# Patient Record
Sex: FEMALE | Race: WHITE | NOT HISPANIC OR LATINO | URBAN - METROPOLITAN AREA
[De-identification: names, ages, dates, MRNs, and addresses within clinical notes are randomized per-mention and may not be internally consistent; named-entity substitution may affect disease eponyms.]

---

## 2017-01-06 ENCOUNTER — OUTPATIENT (OUTPATIENT)
Dept: OUTPATIENT SERVICES | Facility: HOSPITAL | Age: 22
LOS: 1 days | End: 2017-01-06
Payer: COMMERCIAL

## 2017-01-06 DIAGNOSIS — Z98.89 OTHER SPECIFIED POSTPROCEDURAL STATES: Chronic | ICD-10-CM

## 2017-01-06 PROCEDURE — 74020: CPT | Mod: 26

## 2017-01-06 PROCEDURE — 74020: CPT

## 2018-09-16 VITALS
HEART RATE: 82 BPM | TEMPERATURE: 99 F | WEIGHT: 160.06 LBS | SYSTOLIC BLOOD PRESSURE: 140 MMHG | OXYGEN SATURATION: 99 % | DIASTOLIC BLOOD PRESSURE: 84 MMHG | RESPIRATION RATE: 18 BRPM

## 2018-09-16 PROCEDURE — 74177 CT ABD & PELVIS W/CONTRAST: CPT | Mod: 26

## 2018-09-16 RX ORDER — DIPHENHYDRAMINE HCL 50 MG
25 CAPSULE ORAL ONCE
Qty: 0 | Refills: 0 | Status: COMPLETED | OUTPATIENT
Start: 2018-09-16 | End: 2018-09-16

## 2018-09-16 RX ORDER — DIPHENHYDRAMINE HCL 50 MG
25 CAPSULE ORAL EVERY 4 HOURS
Qty: 0 | Refills: 0 | Status: DISCONTINUED | OUTPATIENT
Start: 2018-09-16 | End: 2018-09-17

## 2018-09-16 RX ORDER — IOHEXOL 300 MG/ML
30 INJECTION, SOLUTION INTRAVENOUS ONCE
Qty: 0 | Refills: 0 | Status: COMPLETED | OUTPATIENT
Start: 2018-09-16 | End: 2018-09-16

## 2018-09-16 RX ORDER — ONDANSETRON 8 MG/1
4 TABLET, FILM COATED ORAL ONCE
Qty: 0 | Refills: 0 | Status: COMPLETED | OUTPATIENT
Start: 2018-09-16 | End: 2018-09-16

## 2018-09-16 RX ORDER — HYDROMORPHONE HYDROCHLORIDE 2 MG/ML
1 INJECTION INTRAMUSCULAR; INTRAVENOUS; SUBCUTANEOUS ONCE
Qty: 0 | Refills: 0 | Status: DISCONTINUED | OUTPATIENT
Start: 2018-09-16 | End: 2018-09-16

## 2018-09-16 RX ORDER — CEFOTETAN DISODIUM 1 G
2 VIAL (EA) INJECTION ONCE
Qty: 0 | Refills: 0 | Status: COMPLETED | OUTPATIENT
Start: 2018-09-16 | End: 2018-09-16

## 2018-09-16 RX ORDER — SODIUM CHLORIDE 9 MG/ML
1000 INJECTION INTRAMUSCULAR; INTRAVENOUS; SUBCUTANEOUS ONCE
Qty: 0 | Refills: 0 | Status: COMPLETED | OUTPATIENT
Start: 2018-09-16 | End: 2018-09-16

## 2018-09-16 RX ADMIN — Medication 25 MILLIGRAM(S): at 21:05

## 2018-09-16 RX ADMIN — HYDROMORPHONE HYDROCHLORIDE 1 MILLIGRAM(S): 2 INJECTION INTRAMUSCULAR; INTRAVENOUS; SUBCUTANEOUS at 17:32

## 2018-09-16 RX ADMIN — SODIUM CHLORIDE 1000 MILLILITER(S): 9 INJECTION INTRAMUSCULAR; INTRAVENOUS; SUBCUTANEOUS at 17:31

## 2018-09-16 RX ADMIN — HYDROMORPHONE HYDROCHLORIDE 1 MILLIGRAM(S): 2 INJECTION INTRAMUSCULAR; INTRAVENOUS; SUBCUTANEOUS at 21:54

## 2018-09-16 RX ADMIN — Medication 100 GRAM(S): at 21:05

## 2018-09-16 RX ADMIN — HYDROMORPHONE HYDROCHLORIDE 1 MILLIGRAM(S): 2 INJECTION INTRAMUSCULAR; INTRAVENOUS; SUBCUTANEOUS at 23:45

## 2018-09-16 RX ADMIN — IOHEXOL 30 MILLILITER(S): 300 INJECTION, SOLUTION INTRAVENOUS at 17:47

## 2018-09-16 RX ADMIN — HYDROMORPHONE HYDROCHLORIDE 1 MILLIGRAM(S): 2 INJECTION INTRAMUSCULAR; INTRAVENOUS; SUBCUTANEOUS at 20:06

## 2018-09-16 RX ADMIN — Medication 25 MILLIGRAM(S): at 17:31

## 2018-09-16 RX ADMIN — ONDANSETRON 4 MILLIGRAM(S): 8 TABLET, FILM COATED ORAL at 17:31

## 2018-09-16 RX ADMIN — HYDROMORPHONE HYDROCHLORIDE 1 MILLIGRAM(S): 2 INJECTION INTRAMUSCULAR; INTRAVENOUS; SUBCUTANEOUS at 19:13

## 2018-09-16 NOTE — ED PROVIDER NOTE - CARE PLAN
Principal Discharge DX:	Abdominal pain, unspecified abdominal location  Secondary Diagnosis:	Endometriosis

## 2018-09-16 NOTE — ED PROVIDER NOTE - MEDICAL DECISION MAKING DETAILS
Impression: acute on chronic abd pain, being followed by pain management, on oxy and fentanyl patch. Afebrile, hds. Labs reviewed and wnl including wbc, lfts, lipase. UA neg for infection, + ketones. CT a/p concerning for possible appendicitis. Pt given dose of cefotetan and surgery consult obtained- do not suspect appendicitis- findings may be reactive 2/2 endometriosis. Surgery requesting ruq sono to r/o cholecystitis. Pt requiring multiple doses of dilaudid. Case d/w Dr. Wilson. Impression: acute on chronic abd pain, being followed by pain management, on oxy and fentanyl patch. Afebrile, hds. Labs reviewed and wnl including wbc, lfts, lipase. UA neg for infection, + ketones. CT a/p concerning for possible appendicitis. Pt given dose of cefotetan and surgery consult obtained- do not suspect appendicitis- findings may be reactive 2/2 endometriosis. Surgery requesting ruq sono to r/o cholecystitis. Pt requiring multiple doses of dilaudid. Case d/w Dr. Wilson. Gyn consult also requested for evaluation. Impression: acute on chronic abd pain, being followed by pain management, on oxy and fentanyl patch. Afebrile, hds. Labs reviewed and wnl including wbc, lfts, lipase. UA neg for infection, + ketones. CT a/p concerning for possible appendicitis. Pt given dose of cefotetan and surgery consult obtained- do not suspect appendicitis- findings may be reactive 2/2 endometriosis. Surgery requesting ruq sono to r/o cholecystitis. Pt requiring multiple doses of dilaudid. Case d/w Dr. Wilson. Gyn consult also requested for evaluation.  Patient with intractable pain requiring multiple dosages of pain meds in ED . Seen by two services both surgery and gyn. Both services sign off on case. Patient admitted now to medicine for pain control and further eval and re evaluation. Patient is pt of Dr. Wilson.

## 2018-09-16 NOTE — ED PROVIDER NOTE - OBJECTIVE STATEMENT
Pt is a 23yo f, h/o endometriosis s/p laparoscopy '15 and '16, being followed by pain management, on oxy and fentanyl patch, who p/w abd and chest pain x 3 days. CP described as lower chest/ epig region, occurs when abd pain is severe, a/w n/v/d. No sob, palp. Abd pain to rlq, constant, 10/10, no allev/ aggrav factors. No vag bleeding/ dc. No urinary sx's, flank pain. Sent by Dr. Wilson for evaluation. Pt is a 23yo f, h/o endometriosis s/p laparoscopy '15 and '16, being followed by pain management, on oxy and fentanyl patch, who p/w abd and chest pain x 3 days. CP described as tightness to lower chest/ epig region, occurs when abd pain is severe, a/w n/v/d. + belching and burning sensation to chest. + mild sob, palp/ heart racing. Abd pain to rlq, constant, 10/10, no allev/ aggrav factors. No vag bleeding/ dc. No urinary sx's, flank pain. Sent by Dr. Wilson for evaluation.

## 2018-09-16 NOTE — ED ADULT TRIAGE NOTE - CHIEF COMPLAINT QUOTE
c/o diffused abdominal pain, vomiting, diarrhea, dizziness x 3 days.  Patient has not been able to tolerate PO intake.  Hx endometriosis.  Denies recent travels, fevers.  Sent in by Dr. Wilson

## 2018-09-16 NOTE — ED ADULT NURSE NOTE - OBJECTIVE STATEMENT
c.o diffuse abd pain for 3 days with nausea, vomiting, dizziness, chest pain and difficulty in urinating. c.o diarrhea for "many years". denies any fever/chills. states she took a oxycodone this morning and had a fentanyl patch on left upper chest wall (12mcg) for 2 days. denies any sob. ekg at bedside

## 2018-09-16 NOTE — ED PROVIDER NOTE - PHYSICAL EXAMINATION
VITAL SIGNS: I have reviewed nursing notes and confirm.  CONSTITUTIONAL: Well-developed; well-nourished; in no acute distress.   SKIN:  warm and dry, no acute rash.   HEAD:  normocephalic, atraumatic.  EYES: PERRL, EOM intact; conjunctiva and sclera clear.  ENT: No nasal discharge; airway clear.   NECK: Supple; non tender.  CARD: S1, S2 normal; no murmurs, gallops, or rubs. Regular rate and rhythm.   RESP:  Clear to auscultation b/l, no wheezes, rales or rhonchi.  ABD: Normal bowel sounds; soft; non-distended; + generalized tenderness; no guarding/ rebound. No cvat.   EXT: Normal ROM. No clubbing, cyanosis or edema. 2+ pulses to b/l ue/le.  NEURO: Alert, oriented, grossly unremarkable  PSYCH: Cooperative, mood and affect appropriate. VITAL SIGNS: I have reviewed nursing notes and confirm.  CONSTITUTIONAL: Well-developed; well-nourished; in no acute distress.   SKIN:  warm and dry, no acute rash.   HEAD:  normocephalic, atraumatic.  EYES: PERRL, EOM intact; conjunctiva and sclera clear.  ENT: No nasal discharge; airway clear.   NECK: Supple; non tender.  CARD: S1, S2 normal; no murmurs, gallops, or rubs. Regular rate and rhythm.   RESP:  Clear to auscultation b/l, no wheezes, rales or rhonchi.  ABD: Normal bowel sounds; soft; non-distended; + generalized tenderness, worst to r abd (ruq/r mid abd), no guarding rebound. No cvat.   EXT: Normal ROM. No clubbing, cyanosis or edema. 2+ pulses to b/l ue/le.  NEURO: Alert, oriented, grossly unremarkable  PSYCH: Cooperative, mood and affect appropriate. VITAL SIGNS: I have reviewed nursing notes and confirm.  CONSTITUTIONAL: Well-developed; well-nourished; in mild painful distress.   SKIN:  warm and dry, no acute rash.   HEAD:  normocephalic, atraumatic.  EYES: PERRL, EOM intact; conjunctiva and sclera clear.  ENT: No nasal discharge; airway clear.   NECK: Supple; non tender.  CARD: S1, S2 normal; no murmurs, gallops, or rubs. Regular rate and rhythm.   RESP:  Clear to auscultation b/l, no wheezes, rales or rhonchi.  ABD: Normal bowel sounds; soft; non-distended; + generalized tenderness, worst to r abd (ruq/r mid abd), no guarding rebound. No cvat.   EXT: Normal ROM. No clubbing, cyanosis or edema. 2+ pulses to b/l ue/le.  NEURO: Alert, oriented, grossly unremarkable  PSYCH: Cooperative, mood and affect appropriate.

## 2018-09-17 ENCOUNTER — INPATIENT (INPATIENT)
Facility: HOSPITAL | Age: 23
LOS: 7 days | Discharge: HOME | DRG: 749 | End: 2018-09-25
Attending: INTERNAL MEDICINE | Admitting: INTERNAL MEDICINE
Payer: COMMERCIAL

## 2018-09-17 DIAGNOSIS — K59.00 CONSTIPATION, UNSPECIFIED: ICD-10-CM

## 2018-09-17 DIAGNOSIS — R10.9 UNSPECIFIED ABDOMINAL PAIN: ICD-10-CM

## 2018-09-17 DIAGNOSIS — F41.9 ANXIETY DISORDER, UNSPECIFIED: ICD-10-CM

## 2018-09-17 DIAGNOSIS — R63.8 OTHER SYMPTOMS AND SIGNS CONCERNING FOOD AND FLUID INTAKE: ICD-10-CM

## 2018-09-17 DIAGNOSIS — Z98.89 OTHER SPECIFIED POSTPROCEDURAL STATES: Chronic | ICD-10-CM

## 2018-09-17 DIAGNOSIS — Z91.89 OTHER SPECIFIED PERSONAL RISK FACTORS, NOT ELSEWHERE CLASSIFIED: ICD-10-CM

## 2018-09-17 DIAGNOSIS — N80.9 ENDOMETRIOSIS, UNSPECIFIED: ICD-10-CM

## 2018-09-17 DIAGNOSIS — N32.89 OTHER SPECIFIED DISORDERS OF BLADDER: ICD-10-CM

## 2018-09-17 LAB
ANION GAP SERPL CALC-SCNC: 14 MMOL/L — SIGNIFICANT CHANGE UP (ref 5–17)
BUN SERPL-MCNC: 5 MG/DL — LOW (ref 7–23)
CALCIUM SERPL-MCNC: 9.4 MG/DL — SIGNIFICANT CHANGE UP (ref 8.4–10.5)
CHLORIDE SERPL-SCNC: 102 MMOL/L — SIGNIFICANT CHANGE UP (ref 96–108)
CO2 SERPL-SCNC: 25 MMOL/L — SIGNIFICANT CHANGE UP (ref 22–31)
CREAT SERPL-MCNC: 1.04 MG/DL — SIGNIFICANT CHANGE UP (ref 0.5–1.3)
GLUCOSE SERPL-MCNC: 91 MG/DL — SIGNIFICANT CHANGE UP (ref 70–99)
HCT VFR BLD CALC: 33.8 % — LOW (ref 34.5–45)
HGB BLD-MCNC: 12 G/DL — SIGNIFICANT CHANGE UP (ref 11.5–15.5)
MAGNESIUM SERPL-MCNC: 1.9 MG/DL — SIGNIFICANT CHANGE UP (ref 1.6–2.6)
MCHC RBC-ENTMCNC: 30.7 PG — SIGNIFICANT CHANGE UP (ref 27–34)
MCHC RBC-ENTMCNC: 35.5 G/DL — SIGNIFICANT CHANGE UP (ref 32–36)
MCV RBC AUTO: 86.4 FL — SIGNIFICANT CHANGE UP (ref 80–100)
PLATELET # BLD AUTO: 202 K/UL — SIGNIFICANT CHANGE UP (ref 150–400)
POTASSIUM SERPL-MCNC: 3.8 MMOL/L — SIGNIFICANT CHANGE UP (ref 3.5–5.3)
POTASSIUM SERPL-SCNC: 3.8 MMOL/L — SIGNIFICANT CHANGE UP (ref 3.5–5.3)
RBC # BLD: 3.91 M/UL — SIGNIFICANT CHANGE UP (ref 3.8–5.2)
RBC # FLD: 11.9 % — SIGNIFICANT CHANGE UP (ref 10.3–16.9)
SODIUM SERPL-SCNC: 141 MMOL/L — SIGNIFICANT CHANGE UP (ref 135–145)
WBC # BLD: 5.3 K/UL — SIGNIFICANT CHANGE UP (ref 3.8–10.5)
WBC # FLD AUTO: 5.3 K/UL — SIGNIFICANT CHANGE UP (ref 3.8–10.5)

## 2018-09-17 PROCEDURE — 76705 ECHO EXAM OF ABDOMEN: CPT | Mod: 26

## 2018-09-17 PROCEDURE — 93010 ELECTROCARDIOGRAM REPORT: CPT | Mod: NC

## 2018-09-17 PROCEDURE — 99285 EMERGENCY DEPT VISIT HI MDM: CPT | Mod: 25

## 2018-09-17 RX ORDER — FENTANYL CITRATE 50 UG/ML
1 INJECTION INTRAVENOUS
Qty: 0 | Refills: 0 | Status: DISCONTINUED | OUTPATIENT
Start: 2018-09-17 | End: 2018-09-19

## 2018-09-17 RX ORDER — DIPHENHYDRAMINE HCL 50 MG
12.5 CAPSULE ORAL EVERY 4 HOURS
Qty: 0 | Refills: 0 | Status: DISCONTINUED | OUTPATIENT
Start: 2018-09-17 | End: 2018-09-25

## 2018-09-17 RX ORDER — FLUOXETINE HCL 10 MG
40 CAPSULE ORAL DAILY
Qty: 0 | Refills: 0 | Status: DISCONTINUED | OUTPATIENT
Start: 2018-09-17 | End: 2018-09-25

## 2018-09-17 RX ORDER — OXYCODONE HYDROCHLORIDE 5 MG/1
1 TABLET ORAL
Qty: 0 | Refills: 0 | COMMUNITY

## 2018-09-17 RX ORDER — HYDROMORPHONE HYDROCHLORIDE 2 MG/ML
1 INJECTION INTRAMUSCULAR; INTRAVENOUS; SUBCUTANEOUS EVERY 4 HOURS
Qty: 0 | Refills: 0 | Status: DISCONTINUED | OUTPATIENT
Start: 2018-09-17 | End: 2018-09-18

## 2018-09-17 RX ORDER — DOCUSATE SODIUM 100 MG
1 CAPSULE ORAL
Qty: 0 | Refills: 0 | COMMUNITY

## 2018-09-17 RX ORDER — DIAZEPAM 5 MG
20 TABLET ORAL
Qty: 0 | Refills: 0 | COMMUNITY

## 2018-09-17 RX ORDER — DOCUSATE SODIUM 100 MG
100 CAPSULE ORAL
Qty: 0 | Refills: 0 | Status: DISCONTINUED | OUTPATIENT
Start: 2018-09-17 | End: 2018-09-18

## 2018-09-17 RX ORDER — DIAZEPAM 5 MG
1 TABLET ORAL
Qty: 0 | Refills: 0 | COMMUNITY

## 2018-09-17 RX ORDER — ONDANSETRON 8 MG/1
4 TABLET, FILM COATED ORAL ONCE
Qty: 0 | Refills: 0 | Status: COMPLETED | OUTPATIENT
Start: 2018-09-17 | End: 2018-09-17

## 2018-09-17 RX ORDER — SODIUM CHLORIDE 9 MG/ML
1000 INJECTION INTRAMUSCULAR; INTRAVENOUS; SUBCUTANEOUS
Qty: 0 | Refills: 0 | Status: DISCONTINUED | OUTPATIENT
Start: 2018-09-17 | End: 2018-09-18

## 2018-09-17 RX ORDER — ALPRAZOLAM 0.25 MG
1 TABLET ORAL
Qty: 0 | Refills: 0 | COMMUNITY

## 2018-09-17 RX ORDER — FENTANYL CITRATE 50 UG/ML
1 INJECTION INTRAVENOUS
Qty: 0 | Refills: 0 | COMMUNITY

## 2018-09-17 RX ORDER — HYDROMORPHONE HYDROCHLORIDE 2 MG/ML
1 INJECTION INTRAMUSCULAR; INTRAVENOUS; SUBCUTANEOUS ONCE
Qty: 0 | Refills: 0 | Status: DISCONTINUED | OUTPATIENT
Start: 2018-09-17 | End: 2018-09-17

## 2018-09-17 RX ORDER — FLUOXETINE HCL 10 MG
1 CAPSULE ORAL
Qty: 0 | Refills: 0 | COMMUNITY

## 2018-09-17 RX ORDER — HYDROMORPHONE HYDROCHLORIDE 2 MG/ML
0.5 INJECTION INTRAMUSCULAR; INTRAVENOUS; SUBCUTANEOUS
Qty: 0 | Refills: 0 | Status: DISCONTINUED | OUTPATIENT
Start: 2018-09-17 | End: 2018-09-18

## 2018-09-17 RX ORDER — LINACLOTIDE 145 UG/1
1 CAPSULE, GELATIN COATED ORAL
Qty: 0 | Refills: 0 | COMMUNITY

## 2018-09-17 RX ORDER — TIZANIDINE 4 MG/1
4 TABLET ORAL
Qty: 0 | Refills: 0 | COMMUNITY

## 2018-09-17 RX ORDER — NORETHINDRONE 0.35 MG/1
1 TABLET ORAL
Qty: 0 | Refills: 0 | COMMUNITY

## 2018-09-17 RX ADMIN — ONDANSETRON 4 MILLIGRAM(S): 8 TABLET, FILM COATED ORAL at 14:17

## 2018-09-17 RX ADMIN — Medication 100 MILLIGRAM(S): at 18:16

## 2018-09-17 RX ADMIN — Medication 12.5 MILLIGRAM(S): at 13:53

## 2018-09-17 RX ADMIN — HYDROMORPHONE HYDROCHLORIDE 1 MILLIGRAM(S): 2 INJECTION INTRAMUSCULAR; INTRAVENOUS; SUBCUTANEOUS at 03:21

## 2018-09-17 RX ADMIN — Medication 12.5 MILLIGRAM(S): at 04:33

## 2018-09-17 RX ADMIN — SODIUM CHLORIDE 120 MILLILITER(S): 9 INJECTION INTRAMUSCULAR; INTRAVENOUS; SUBCUTANEOUS at 20:40

## 2018-09-17 RX ADMIN — HYDROMORPHONE HYDROCHLORIDE 0.5 MILLIGRAM(S): 2 INJECTION INTRAMUSCULAR; INTRAVENOUS; SUBCUTANEOUS at 23:58

## 2018-09-17 RX ADMIN — HYDROMORPHONE HYDROCHLORIDE 1 MILLIGRAM(S): 2 INJECTION INTRAMUSCULAR; INTRAVENOUS; SUBCUTANEOUS at 09:31

## 2018-09-17 RX ADMIN — HYDROMORPHONE HYDROCHLORIDE 1 MILLIGRAM(S): 2 INJECTION INTRAMUSCULAR; INTRAVENOUS; SUBCUTANEOUS at 22:35

## 2018-09-17 RX ADMIN — HYDROMORPHONE HYDROCHLORIDE 1 MILLIGRAM(S): 2 INJECTION INTRAMUSCULAR; INTRAVENOUS; SUBCUTANEOUS at 13:18

## 2018-09-17 RX ADMIN — Medication 40 MILLIGRAM(S): at 12:00

## 2018-09-17 RX ADMIN — Medication 12.5 MILLIGRAM(S): at 09:25

## 2018-09-17 RX ADMIN — Medication 12.5 MILLIGRAM(S): at 23:43

## 2018-09-17 RX ADMIN — HYDROMORPHONE HYDROCHLORIDE 1 MILLIGRAM(S): 2 INJECTION INTRAMUSCULAR; INTRAVENOUS; SUBCUTANEOUS at 13:33

## 2018-09-17 RX ADMIN — HYDROMORPHONE HYDROCHLORIDE 1 MILLIGRAM(S): 2 INJECTION INTRAMUSCULAR; INTRAVENOUS; SUBCUTANEOUS at 04:31

## 2018-09-17 RX ADMIN — HYDROMORPHONE HYDROCHLORIDE 1 MILLIGRAM(S): 2 INJECTION INTRAMUSCULAR; INTRAVENOUS; SUBCUTANEOUS at 09:14

## 2018-09-17 RX ADMIN — Medication 100 MILLIGRAM(S): at 05:07

## 2018-09-17 RX ADMIN — HYDROMORPHONE HYDROCHLORIDE 0.5 MILLIGRAM(S): 2 INJECTION INTRAMUSCULAR; INTRAVENOUS; SUBCUTANEOUS at 07:58

## 2018-09-17 RX ADMIN — Medication 20 MILLIGRAM(S): at 18:15

## 2018-09-17 RX ADMIN — HYDROMORPHONE HYDROCHLORIDE 0.5 MILLIGRAM(S): 2 INJECTION INTRAMUSCULAR; INTRAVENOUS; SUBCUTANEOUS at 07:43

## 2018-09-17 RX ADMIN — HYDROMORPHONE HYDROCHLORIDE 1 MILLIGRAM(S): 2 INJECTION INTRAMUSCULAR; INTRAVENOUS; SUBCUTANEOUS at 17:15

## 2018-09-17 RX ADMIN — HYDROMORPHONE HYDROCHLORIDE 1 MILLIGRAM(S): 2 INJECTION INTRAMUSCULAR; INTRAVENOUS; SUBCUTANEOUS at 21:45

## 2018-09-17 RX ADMIN — SODIUM CHLORIDE 120 MILLILITER(S): 9 INJECTION INTRAMUSCULAR; INTRAVENOUS; SUBCUTANEOUS at 04:34

## 2018-09-17 RX ADMIN — HYDROMORPHONE HYDROCHLORIDE 1 MILLIGRAM(S): 2 INJECTION INTRAMUSCULAR; INTRAVENOUS; SUBCUTANEOUS at 04:46

## 2018-09-17 RX ADMIN — SODIUM CHLORIDE 120 MILLILITER(S): 9 INJECTION INTRAMUSCULAR; INTRAVENOUS; SUBCUTANEOUS at 12:12

## 2018-09-17 RX ADMIN — HYDROMORPHONE HYDROCHLORIDE 1 MILLIGRAM(S): 2 INJECTION INTRAMUSCULAR; INTRAVENOUS; SUBCUTANEOUS at 17:30

## 2018-09-17 RX ADMIN — FENTANYL CITRATE 1 PATCH: 50 INJECTION INTRAVENOUS at 12:12

## 2018-09-17 NOTE — H&P ADULT - PROBLEM SELECTOR PLAN 4
States taking Fluoxetine 40, Buspirone 20, Valium 20, Mkuk1ki prn, Prozac 90q weekly.    continued home Fluoxetine and Buspirone.   - Please obtain med rec and start home meds as tolerated

## 2018-09-17 NOTE — H&P ADULT - ASSESSMENT
22 yoF w/ pmh of chronic abdominal pain, endometriosis s/p dx lap in 2015 and 2016, ovarian cysts, anxiety, and asthma presenting with RUQ pain, nausea vomiting for the past 3 days.

## 2018-09-17 NOTE — CONSULT NOTE ADULT - SUBJECTIVE AND OBJECTIVE BOX
21yo G0 presenting with nausea, vomiting and abdominal pain for past 3 days.  She reports three days ago started vomiting and has not been able to tolerate anything by mouth without vomiting.  After persistently vomiting she then developed pain in the upper part of her abdomen, in the right upper quadrant of her abdomen and radiating up to her chest.  She also reports constipation.  She denies SOB, vaginal bleeding, abnormal vaginal discharge, diarrhea.  She denies fevers or chills.  She has a history of endometriosis and reports that this pain "is different than [her] endometriosis pain".  She has had 2 prior laparoscopic endometriosis excision procedures in  and 2016, as well as colonoscopies and endoscopies for further evaluation of chronic abdominal pain, constipation/diarrhea which revealed hemorrhagic gastritis and colitis. Does not menstruate secondary to continuous norethidrone use.     OB/GYN Hx: Never sexually active; h/o endometriosis with 2 prior endometriosis excisions in  and ; currently enrolled in pelvic physical therapy program for pelvic pain management and sees pain management specialist who prescribes fentanyl patch, oxycodone   PMHx: remote h/o campylobacter infection, hemorrhagic gastritis, ashtma   SHx: , 2016 diagnostic laparoscopy w/ endometriosis excision (with Dr. Willis and another OBGYN in )  Meds: oxycodone 15mg 5xdaily, fentanyl 12mcg patch every 2 days, medical marijuana, valium 20mg qhs, buspirone 20mg TID, norethidrone 5mg  Allergies: sulfa -- rash, morphine - rash     PHYSICAL EXAM:   Vital Signs Last 24 Hrs  T(C): 36.8 (16 Sep 2018 23:43), Max: 37 (16 Sep 2018 16:16)  T(F): 98.3 (16 Sep 2018 23:43), Max: 98.6 (16 Sep 2018 16:16)  HR: 74 (16 Sep 2018 23:43) (74 - 82)  BP: 137/85 (16 Sep 2018 23:43) (133/73 - 140/84)  BP(mean): --  RR: 18 (16 Sep 2018 23:43) (16 - 18)  SpO2: 99% (16 Sep 2018 23:43) (99% - 99%)    **************************  Constitutional: Alert & Oriented x3, emotionally upset  Respiratory: regular work of breathing   Gastrointestinal: soft, mildly tender to palpation in RUQ and L/R LQ, positive bowel sounds, no rebound or guarding   Pelvic exam: normal external genitalia, no CMT, no adnexal masses felt, no abnormal discharge, mild LLQ and RLQ tenderness to palpation  Extremities: no calf tenderness or swelling      LABS:                        14.0   4.6   )-----------( 218      ( 16 Sep 2018 16:34 )             39.8         140  |  98  |  6<L>  ----------------------------<  104<H>  4.0   |  25  |  0.95    Ca    9.8      16 Sep 2018 16:34    TPro  7.5  /  Alb  4.8  /  TBili  0.5  /  DBili  x   /  AST  23  /  ALT  17  /  AlkPhos  68      PT/INR - ( 16 Sep 2018 16:34 )   PT: 12.5 sec;   INR: 1.12          PTT - ( 16 Sep 2018 16:34 )  PTT:31.1 sec  Urinalysis Basic - ( 16 Sep 2018 16:51 )    Color: Yellow / Appearance: Clear / S.015 / pH: x  Gluc: x / Ketone: 40 mg/dL  / Bili: Negative / Urobili: 0.2 E.U./dL   Blood: x / Protein: NEGATIVE mg/dL / Nitrite: NEGATIVE   Leuk Esterase: NEGATIVE / RBC: x / WBC x   Sq Epi: x / Non Sq Epi: x / Bacteria: x      HCG Quantitative, Serum: <.1 mIU/mL ( @ 16:34)      RADIOLOGY & ADDITIONAL STUDIES:  < from: CT Abdomen and Pelvis w/ Oral Cont and w/ IV Cont (18 @ 19:18) >    EXAM:  CT ABDOMEN AND PELVIS OC IC                          PROCEDURE DATE:  2018          INTERPRETATION:  CT ABDOMEN AND PELVIS DATED 2018 7:18 PM    CLINICAL STATEMENT: generalized abd pain worst to r abd    TECHNIQUE: Multislice helical sections were obtained from the domes of   the diaphragm to the iliac crests with oral and intravenous contrast   administration.    COMPARISON: CT abdomen 2016    FINDINGS:    Clear lung bases. No cardiomegaly.    The liver, gallbladder, spleen, pancreas and bilateral adrenal glands are   grossly unremarkable. There is symmetric bilateral renal enhancement. No   hydronephrosis. Moderately distended urinary bladder without focal wall   thickening or layering calculus. No obstructing renalor ureter calculus   appreciated. Uterus appears grossly unremarkable. There is small volume   ascites within the pelvis. Oral contrast progresses to the rectum. No   evidence of bowel obstruction. The proximal segment of the appendix is   normal in caliber and contains contrast however the mid and distal   segments are thickened demonstrating mucosal enhancement and measuring up   to 7 mm in diameter. No significant adjacent periappendiceal fat   stranding or free fluid appreciated. This was notpresent on the prior CT   abdomen and pelvic study of 2016. Findings may represent an early   acute distal appendicitis. Recommend further clinical correlation with   the patient's presentation, and laboratory results. Surgical consultation   may be of help.    No intraperitoneal free air or ascites. Osseous structures are intact.    IMPRESSION:  The proximal segment of the appendix is normal in caliber and contains   contrast however the mid and distal segments are thickened demonstrating   mucosal enhancement and measuring up to 7 mm in diameter. No significant   adjacent periappendiceal fat stranding or free fluid appreciated. This   was not present on the prior CT abdomen and pelvic study of 2016.   Findings may represent an early acute distal appendicitis. Recommend   further clinical correlation with the patient's presentation, and   laboratory results. Surgical consultation may be of help.            "Thank you for the opportunity to participate in the care of this   patient."        MANUELA SOLANO M.D., ATTENDING RADIOLOGIST  This document has been electronically signed. Sep 16 2018  8:17PM                  < end of copied text >

## 2018-09-17 NOTE — CONSULT NOTE ADULT - CONSULT REASON
Pain Management: patient is well known to me as an outpatient for chronic pelvic pain. h/o endometriosis

## 2018-09-17 NOTE — H&P ADULT - HISTORY OF PRESENT ILLNESS
22 yoF w/ pmh of chronic abdominal pain, endometriosis s/p dx lap in 2015 and 2016, ovarian cysts, anxiety, and asthma presenting with RUQ pain, nausea vomiting for the past 3 days. Pt states that her chronic pain in RLQ but 3 days she started experiencing RUQ pain that is sharp in nature and radiates to chest. Poor PO intake since onset and pt has NBNB vomiting everytime she eats. No diarrhea. No fevers but felt cold at home.   ROS negative for headache, dizziness, SOB. Patient with chronic hx of urinary retention, recieved injections? for muscle spasticity.   GI hx significant for colonoscopies and endoscopies in the past showing  gastritis and colitis of unknown origin.   In Er, VSS. No leukocytosis. Ct abd showing possible appendicitis, patient seen by Surgery- per recs unlikely appedicitis can folowup opt. No gallstones on RUQ US. Seen by GYn, pain unlikely GNY in etiology. Recommended opt f/u

## 2018-09-17 NOTE — H&P ADULT - PMH
Anxiety    Asthma    Constipation    Endometriosis    H pylori ulcer    Parasite infection    UTI (urinary tract infection)  recurring for the past few months

## 2018-09-17 NOTE — CONSULT NOTE ADULT - ASSESSMENT
Ms. Vazquez is a 23 yo F with     - Patient is unlikely to have acute appendicitis based on history and physical exam. RUQ is negative for gallbladder or biliary pathology.   - No acute surgical intervention at this time.  - Patient should follow up outpatient with Dr. Christian on Friday. Please call [phone] to confirm appointment.  - Patient seen and examined by bedside with chief resident  - Plan discussed with attending and chief resident. Ms. Vazquez is a 21 yo F with abdominal pain likely secondary to chronic abdominal pain or endometriosis.     - Patient is unlikely to have acute appendicitis based on history and physical exam. RUQ is negative for gallbladder or biliary pathology.   - No acute surgical intervention at this time.  - Patient should follow up outpatient with Dr. Christian on Friday. Please call [phone] to confirm appointment.  - Patient seen and examined by bedside with chief resident  - Plan discussed with attending and chief resident.

## 2018-09-17 NOTE — CONSULT NOTE ADULT - SUBJECTIVE AND OBJECTIVE BOX
GENERAL SURGERY CONSULT NOTE    HPI:      SURGERY ADDENDUM:     PAST MEDICAL & SURGICAL HISTORY:  Anxiety  Asthma  UTI (urinary tract infection): recurring for the past few months  Constipation  Parasite infection  Endometriosis  H pylori ulcer  H/O laparoscopy: for endometriosis      PAST SURGICAL HISTORY:     REVIEW OF SYSTEMS:   Pertinent positives/negatives noted in HPI.     HOME MEDICATIONS:    ALLERGIES:  Allergies    morphine (Rash)  sulfa drugs (Unknown)    Intolerances        SOCIAL HISTORY:    FAMILY HISTORY:  No pertinent family history in first degree relatives      Vital Signs Last 24 Hrs  T(C): 36.8 (16 Sep 2018 23:43), Max: 37 (16 Sep 2018 16:16)  T(F): 98.3 (16 Sep 2018 23:43), Max: 98.6 (16 Sep 2018 16:16)  HR: 74 (16 Sep 2018 23:43) (74 - 82)  BP: 137/85 (16 Sep 2018 23:43) (133/73 - 140/84)  BP(mean): --  RR: 18 (16 Sep 2018 23:43) (16 - 18)  SpO2: 99% (16 Sep 2018 23:43) (99% - 99%)    PHYSICAL EXAM:  General: no acute distress  Cardiovascular: NSR  Pulmonary: nonlabored breathing, no respiratory distress  Abdomen: soft, nondistended, nontender  Extremities: nonedematous    LABS:                        14.0   4.6   )-----------( 218      ( 16 Sep 2018 16:34 )             39.8     09-16    140  |  98  |  6<L>  ----------------------------<  104<H>  4.0   |  25  |  0.95    Ca    9.8      16 Sep 2018 16:34    TPro  7.5  /  Alb  4.8  /  TBili  0.5  /  DBili  x   /  AST  23  /  ALT  17  /  AlkPhos  68  09-16    PT/INR - ( 16 Sep 2018 16:34 )   PT: 12.5 sec;   INR: 1.12          PTT - ( 16 Sep 2018 16:34 )  PTT:31.1 sec  Urinalysis Basic - ( 16 Sep 2018 16:51 )    Color: Yellow / Appearance: Clear / S.015 / pH: x  Gluc: x / Ketone: 40 mg/dL  / Bili: Negative / Urobili: 0.2 E.U./dL   Blood: x / Protein: NEGATIVE mg/dL / Nitrite: NEGATIVE   Leuk Esterase: NEGATIVE / RBC: x / WBC x   Sq Epi: x / Non Sq Epi: x / Bacteria: x GENERAL SURGERY CONSULT NOTE    HPI:    Ms. Vazquez is a 21 yo F w/ history of chronic abdominal pain (since age 8), endometriosis s/p dx lap in  and 2016, ovarian cysts, anxiety, and asthma presenting to [facility] ED with worsening abdominal pain, nausea and vomiting for the past 3 days. Patient reports that RUQ and lower abdominal pain that has been worsening over the past 3 days associated with daily nausea and vomiting, especially when eating or drinking anything. She reports poor PO intake during this time. She denies associated fevers or chills. Her last bowel movement for 4-5 days ago and last episode of flatus was yesterday. Of note, patient is on chronic opiates for control of her chronic abdominal pain. She has had two colonoscopies and endoscopies previously which reportedly revealed gastritis and colitis of unknown origin. Patient has previously undergone diagnostic laparoscopy for in [id number] for resection of endometriosis; despite the surgeries, patient reports that her abdominal pains persist.     In the ED, patient is afebrile and vitals are stable. WBC is 4.6, no left shift; remaining labs are within normal limits.     PAST MEDICAL & SURGICAL HISTORY:  Anxiety  Asthma  UTI (urinary tract infection): recurring for the past few months  Constipation  Parasite infection  Endometriosis  H pylori ulcer  H/O laparoscopy: for endometriosis      PAST SURGICAL HISTORY:     REVIEW OF SYSTEMS:   Pertinent positives/negatives noted in HPI.     HOME MEDICATIONS:  See med rec    ALLERGIES:  morphine (Rash)  sulfa drugs (Unknown)    SOCIAL HISTORY:  Denies tobacco use, ETOH use, or illicit drug use    FAMILY HISTORY:  Paternal grandmother dx with bladder cancer at age 75      Vital Signs Last 24 Hrs  T(C): 36.8 (16 Sep 2018 23:43), Max: 37 (16 Sep 2018 16:16)  T(F): 98.3 (16 Sep 2018 23:43), Max: 98.6 (16 Sep 2018 16:16)  HR: 74 (16 Sep 2018 23:43) (74 - 82)  BP: 137/85 (16 Sep 2018 23:43) (133/73 - 140/84)  BP(mean): --  RR: 18 (16 Sep 2018 23:43) (16 - 18)  SpO2: 99% (16 Sep 2018 23:43) (99% - 99%)    PHYSICAL EXAM:  General: no acute distress  Cardiovascular: NSR  Pulmonary: nonlabored breathing, no respiratory distress  Abdomen: soft, nondistended, moderate RUQ pain. Mild RLQ and LLQ pain. No guarding or rebound.  Extremities: nonedematous    LABS:                        14.0   4.6   )-----------( 218      ( 16 Sep 2018 16:34 )             39.8     -    140  |  98  |  6<L>  ----------------------------<  104<H>  4.0   |  25  |  0.95    Ca    9.8      16 Sep 2018 16:34    TPro  7.5  /  Alb  4.8  /  TBili  0.5  /  DBili  x   /  AST  23  /  ALT  17  /  AlkPhos  68  -    PT/INR - ( 16 Sep 2018 16:34 )   PT: 12.5 sec;   INR: 1.12          PTT - ( 16 Sep 2018 16:34 )  PTT:31.1 sec  Urinalysis Basic - ( 16 Sep 2018 16:51 )    Color: Yellow / Appearance: Clear / S.015 / pH: x  Gluc: x / Ketone: 40 mg/dL  / Bili: Negative / Urobili: 0.2 E.U./dL   Blood: x / Protein: NEGATIVE mg/dL / Nitrite: NEGATIVE   Leuk Esterase: NEGATIVE / RBC: x / WBC x   Sq Epi: x / Non Sq Epi: x / Bacteria: x

## 2018-09-17 NOTE — CONSULT NOTE ADULT - SUBJECTIVE AND OBJECTIVE BOX
Patient is a 22y old  Female who presents with a chief complaint of abdominal pain (17 Sep 2018 07:33)       HPI:  22 yoF w/ pmh of chronic abdominal pain, endometriosis s/p dx lap in  and 2016, ovarian cysts, anxiety, and asthma presenting with RUQ pain, nausea vomiting for the past 3 days. Pt states that her chronic pain in RLQ but 3 days she started experiencing RUQ pain that is sharp in nature and radiates to chest. Poor PO intake since onset and pt has NBNB vomiting everytime she eats. No diarrhea. No fevers but felt cold at home.   ROS negative for headache, dizziness, SOB. Patient with chronic hx of urinary retention, recieved injections? for muscle spasticity.   GI hx significant for colonoscopies and endoscopies in the past showing  gastritis and colitis of unknown origin.   In Er, VSS. No leukocytosis. Ct abd showing possible appendicitis, patient seen by Surgery- per recs unlikely appedicitis can folowup opt. No gallstones on RUQ US. Seen by GYn, pain unlikely GNY in etiology. Recommended opt f/u (17 Sep 2018 03:13)      PAST MEDICAL & SURGICAL HISTORY:  Anxiety  Asthma  UTI (urinary tract infection): recurring for the past few months  Constipation  Parasite infection  Endometriosis  H pylori ulcer  H/O laparoscopy: for endometriosis      MEDICATIONS  (STANDING):  busPIRone 20 milliGRAM(s) Oral <User Schedule>  docusate sodium 100 milliGRAM(s) Oral two times a day  FLUoxetine 40 milliGRAM(s) Oral daily  sodium chloride 0.9%. 1000 milliLiter(s) (120 mL/Hr) IV Continuous <Continuous>    MEDICATIONS  (PRN):  diphenhydrAMINE   Injectable 12.5 milliGRAM(s) IV Push every 4 hours PRN Rash and/or Itching  HYDROmorphone  Injectable 0.5 milliGRAM(s) IV Push every 3 hours PRN Breakthrough Pain  HYDROmorphone  Injectable 1 milliGRAM(s) IV Push every 4 hours PRN Moderate Pain (4 - 6)        FAMILY HISTORY:  No pertinent family history in first degree relatives      CBC Full  -  ( 17 Sep 2018 07:09 )  WBC Count : 5.3 K/uL  Hemoglobin : 12.0 g/dL  Hematocrit : 33.8 %  Platelet Count - Automated : 202 K/uL  Mean Cell Volume : 86.4 fL  Mean Cell Hemoglobin : 30.7 pg  Mean Cell Hemoglobin Concentration : 35.5 g/dL  Auto Neutrophil # : x  Auto Lymphocyte # : x  Auto Monocyte # : x  Auto Eosinophil # : x  Auto Basophil # : x  Auto Neutrophil % : x  Auto Lymphocyte % : x  Auto Monocyte % : x  Auto Eosinophil % : x  Auto Basophil % : x          141  |  102  |  5<L>  ----------------------------<  91  3.8   |  25  |  1.04    Ca    9.4      17 Sep 2018 07:09  Mg     1.9         TPro  7.5  /  Alb  4.8  /  TBili  0.5  /  DBili  x   /  AST  23  /  ALT  17  /  AlkPhos  68        Urinalysis Basic - ( 16 Sep 2018 16:51 )    Color: Yellow / Appearance: Clear / S.015 / pH: x  Gluc: x / Ketone: 40 mg/dL  / Bili: Negative / Urobili: 0.2 E.U./dL   Blood: x / Protein: NEGATIVE mg/dL / Nitrite: NEGATIVE   Leuk Esterase: NEGATIVE / RBC: x / WBC x   Sq Epi: x / Non Sq Epi: x / Bacteria: x          Radiology:    < from: CT Abdomen and Pelvis w/ Oral Cont and w/ IV Cont (18 @ 19:18) >  EXAM:  CT ABDOMEN AND PELVIS OC IC                          PROCEDURE DATE:  2018          INTERPRETATION:  CT ABDOMEN AND PELVIS DATED 2018 7:18 PM    CLINICAL STATEMENT: generalized abd pain worst to r abd    TECHNIQUE: Multislice helical sections were obtained from the domes of   the diaphragm to the iliac crests with oral and intravenous contrast   administration.    COMPARISON: CT abdomen 2016    FINDINGS:    Clear lung bases. No cardiomegaly.    The liver, gallbladder, spleen, pancreas and bilateral adrenal glands are   grossly unremarkable. There is symmetric bilateral renal enhancement. No   hydronephrosis. Moderately distended urinary bladder without focal wall   thickening or layering calculus. No obstructing renalor ureter calculus   appreciated. Uterus appears grossly unremarkable. There is small volume   ascites within the pelvis. Oral contrast progresses to the rectum. No   evidence of bowel obstruction. The proximal segment of the appendix is   normal in caliber and contains contrast however the mid and distal   segments are thickened demonstrating mucosal enhancement and measuring up   to 7 mm in diameter. No significant adjacent periappendiceal fat   stranding or free fluid appreciated. This was notpresent on the prior CT   abdomen and pelvic study of 2016. Findings may represent an early   acute distal appendicitis. Recommend further clinical correlation with   the patient's presentation, and laboratory results. Surgical consultation   may be of help.    No intraperitoneal free air or ascites. Osseous structures are intact.    IMPRESSION:  The proximal segment of the appendix is normal in caliber and contains   contrast however the mid and distal segments are thickened demonstrating   mucosal enhancement and measuring up to 7 mm in diameter. No significant   adjacent periappendiceal fat stranding or free fluid appreciated. This   was not present on the prior CT abdomen and pelvic study of 2016.   Findings may represent an early acute distal appendicitis. Recommend   further clinical correlation with the patient's presentation, and   laboratory results. Surgical consultation may be of help.                  Vital Signs Last 24 Hrs  T(C): 37.1 (17 Sep 2018 04:54), Max: 37.3 (17 Sep 2018 03:48)  T(F): 98.7 (17 Sep 2018 04:54), Max: 99.2 (17 Sep 2018 03:48)  HR: 74 (17 Sep 2018 04:54) (74 - 84)  BP: 129/81 (17 Sep 2018 04:54) (129/81 - 140/84)  BP(mean): --  RR: 16 (17 Sep 2018 04:54) (16 - 18)  SpO2: 98% (17 Sep 2018 04:54) (98% - 99%)    REVIEW OF SYSTEMS:    CONSTITUTIONAL: No fever, weight loss, or fatigue  EYES: No eye pain, visual disturbances, or discharge  ENMT:  No difficulty hearing, tinnitus, vertigo; No sinus or throat pain  NECK: No pain or stiffness  BREASTS: No pain, masses, or nipple discharge  RESPIRATORY: No cough, wheezing, chills or hemoptysis; No shortness of breath  CARDIOVASCULAR: No chest pain, palpitations, dizziness, or leg swelling  GASTROINTESTINAL: abdominal pain with nausea/vomiting  GENITOURINARY: No dysuria, frequency, hematuria, or incontinence  NEUROLOGICAL: No headaches, memory loss, loss of strength, numbness, or tremors  SKIN: No itching, burning, rashes, or lesions   LYMPH NODES: No enlarged glands  ENDOCRINE: No heat or cold intolerance; No hair loss  MUSCULOSKELETAL: No joint pain or swelling; No muscle, back, or extremity pain  PSYCHIATRIC: No depression, anxiety, mood swings, or difficulty sleeping  HEME/LYMPH: No easy bruising, or bleeding gums  ALLERGY AND IMMUNOLOGIC: No hives or eczema  VASCULAR: no swelling, erythema      Physical Exam: WDWN 21 yo  woman lying in semi Marks's position, parents present at bedside, c/o abdominal pain with intermittent nausea/vomiting    Head: normocephalic, atraumatic    Eyes: PERRLA, EOMI, no nystagmus, sclera anicteric    ENT: nasal discharge, uvula midline, no oropharyngeal erythema/exudate    Neck: supple, negative JVD, negative carotid bruits, no thyromegaly    Chest: CTA bilaterally, neg wheeze, rhonchi, rales, crackles, egophany    Cardiovascular: regular rate and rhythm, neg murmurs/rubs/gallops    Abdomen: soft, non distended, + epigastric TTP, negative rebound/guarding, no bowel sounds, neg hepatosplenomegaly    Extremities: WWP, neg cyanosis/clubbing/edema, negative calf tenderness to palpation, negative Frederic's sign    :     Neurologic Exam:    Motor Exam:    Upper Extremities:              Right:    5/5 /intrinsics                                                          5/5 deltoid                                                          5/5 biceps                                                          5/5 triceps                                                          5/5 wrist extensors-flexors                                                          negative pronator drift                                                Left:      5/5 /intrinsics                                                          5/5 deltoid                                                          5/5 biceps                                                          5/5 triceps                                                          5/5 wrist extensors/flexors                                                          negative pronator drift      Lower Extremities:             Right:      5/5 hip flexors/adductors/abductors                                                           5/5 quadriceps/hamstrings                                                           5/5 dorsiflexors/plantar flexors/invertors-evertors                                               Left:       5/5 hip flexors/adductors/abductors                                                            5/5 quadriceps/hamstrings                                                            5/5 dorsiflexors/plantar flexors/invertors-evertors    Sensory:              intact to LT/PP in all UE/LE dermatomes    DTR:                    2+ = biceps/     triceps/     brachioradialis                             3-4+ patella/   medial hamstring/     1 + bilateral ankle                            neg clonus                            neg Babinski                                  PM&R Impression:     1) chronic pelvic pain/ h/o endometriosis  2) ? early acute appendicitis on CT scan  3) patient's outpatient regimen was fentanyl 12 mcg/hr q 48 hours and oxycodone 15 mg po  5 times per day prn  4) seen by Dr. Toro Nobles : found to have hyperreflexia with decreased temperature/vibration mid abdominal level: recommends MRI Thoracic spine      Recommendations:    1) Resume fentanyl patch at 12 mcg/hr q 48 hours  2) continue dilaudid prn, hold oxycodone po prn

## 2018-09-17 NOTE — H&P ADULT - PROBLEM SELECTOR PLAN 1
In Er, VSS. No leukocytosis. Ct abd showing possible appendicitis, patient seen by Surgery- per recs unlikely appedicitis can folowup opt. No gallstones on RUQ US. Seen by GYn, pain unlikely GNY in etiology. Recommended opt f/u   - Unclear etiology of pain. No signs of infection  - If concern for appendicitis arises, will reconsult surgery  - will continue with pain control with Dilaudid 1mg q4 PRN, 0.5mg q3h for breakthrough pain. To be given with Benadryl given hx of pruritis. At home takes Oxycodone and uses Fentanyl patch  -At home takes Linzess, non formulary medication- please confirm if patient had home medication and start while inpt

## 2018-09-17 NOTE — H&P ADULT - PROBLEM SELECTOR PLAN 3
Sees urology inpt for urinary retention  - States taking Tizanidine 4mg at home- confirm if patient brought home medication and start while inpatient

## 2018-09-17 NOTE — H&P ADULT - NSHPPHYSICALEXAM_GEN_ALL_CORE
.  VITAL SIGNS:  T(C): 37.3 (09-17-18 @ 03:48), Max: 37.3 (09-17-18 @ 03:48)  T(F): 99.2 (09-17-18 @ 03:48), Max: 99.2 (09-17-18 @ 03:48)  HR: 84 (09-17-18 @ 03:48) (74 - 84)  BP: 140/82 (09-17-18 @ 03:48) (133/73 - 140/84)  BP(mean): --  RR: 18 (09-17-18 @ 03:48) (16 - 18)  SpO2: 99% (09-17-18 @ 03:48) (99% - 99%)  Wt(kg): --    PHYSICAL EXAM:    Constitutional: WDWN resting comfortably in bed; NAD  Head: NC/AT  Eyes: PERRL, EOMI, clear conjunctiva  ENT: no nasal discharge; uvula midline, no oropharyngeal erythema or exudates; dry mucous membranes   Neck: supple; no JVD or thyromegaly  Respiratory: CTA B/L; no W/R/R, no retractions  Cardiac: +S1/S2; RRR; no M/R/G; PMI non-displaced  Gastrointestinal: soft, RUQ and RLQ tenderness no rebound or guarding; +BSx4  Genitourinary: normal external genitalia  Back: spine midline, no bony tenderness or step-offs; no CVAT B/L  Extremities: WWP, no clubbing or cyanosis; no peripheral edema  Musculoskeletal: NROM x4; no joint swelling, tenderness or erythema  Vascular: 2+ radial, femoral, DP/PT pulses B/L  Dermatologic: skin warm, dry and intact; no rashes, wounds, or scars  Lymphatic: no submandibular or cervical LAD  Neurologic: AAOx3; CNII-XII grossly intact; no focal deficits  Psychiatric: affect and characteristics of appearance, verbalizations, behaviors are appropriate

## 2018-09-17 NOTE — CONSULT NOTE ADULT - ASSESSMENT
21yo G0 presenting with nausea, vomiting and epigastric pain.  - Patient is known to Dr. Willis, who has been following the patient for her endometriosis  - Per attending Dr. Willis, recommend Pelvic US and MRI Pelvis with contrast if the patient is getting an MRI    D/W Chief resident who agrees with assessment and plan.

## 2018-09-17 NOTE — CONSULT NOTE ADULT - ASSESSMENT
21yo G0 presenting with nausea, vomiting and epigastric pain.  - CT findings consistent with possible appendicitis  - due to epigastric nature of pain and timing after persistent nausea and vomiting, abdominal pain likely secondary to gastritis vs. viral gastroenteritis; unlikely to be secondary to GYN origin  - can f/u with endometriosis surgeon or OBGYN outpatient if pelvic pain returns or to f/u on status of endometriosis    D/W Chief resident who agrees with assessment and plan.

## 2018-09-17 NOTE — PROGRESS NOTE ADULT - SUBJECTIVE AND OBJECTIVE BOX
I examined the patient today, reviewed the lab data, xrays and additional studies. Additionally I have reviewed the notes and assessments by the residents and consultants.   At this point I agree with the assessments and plan.  I would also advise close observation, and supportive care. Neuro and pain mgt

## 2018-09-17 NOTE — H&P ADULT - NSHPLABSRESULTS_GEN_ALL_CORE
.  LABS:                         14.0   4.6   )-----------( 218      ( 16 Sep 2018 16:34 )             39.8     -    140  |  98  |  6<L>  ----------------------------<  104<H>  4.0   |  25  |  0.95    Ca    9.8      16 Sep 2018 16:34    TPro  7.5  /  Alb  4.8  /  TBili  0.5  /  DBili  x   /  AST  23  /  ALT  17  /  AlkPhos  68  09-16    PT/INR - ( 16 Sep 2018 16:34 )   PT: 12.5 sec;   INR: 1.12          PTT - ( 16 Sep 2018 16:34 )  PTT:31.1 sec  Urinalysis Basic - ( 16 Sep 2018 16:51 )    Color: Yellow / Appearance: Clear / S.015 / pH: x  Gluc: x / Ketone: 40 mg/dL  / Bili: Negative / Urobili: 0.2 E.U./dL   Blood: x / Protein: NEGATIVE mg/dL / Nitrite: NEGATIVE   Leuk Esterase: NEGATIVE / RBC: x / WBC x   Sq Epi: x / Non Sq Epi: x / Bacteria: x      CARDIAC MARKERS ( 16 Sep 2018 16:34 )  x     / <0.01 ng/mL / x     / x     / x                RADIOLOGY, EKG & ADDITIONAL TESTS: Reviewed.

## 2018-09-17 NOTE — H&P ADULT - PROBLEM SELECTOR PLAN 2
Hx of endometriosis s/p 2 diag laps. Says current pain is different from endometriosis pain. Seen by GYN in ER, recommend opt followup   - At home takes Norethidrone 5mg=medication not on formlary, check if patient has home medication and continue inpt.

## 2018-09-17 NOTE — H&P ADULT - PROBLEM SELECTOR PLAN 7
1) PCP Contacted on Admission: (Y/N) --> Name & Phone #: Dr. Wilson  2) Date of Contact with PCP:  3) PCP Contacted at Discharge: (Y/N)  4) Summary of Handoff Given to PCP:   5) Post-Discharge Appointment Date and Location:

## 2018-09-17 NOTE — CONSULT NOTE ADULT - SUBJECTIVE AND OBJECTIVE BOX
Patient is a 22y old  Female who presents with a chief complaint of abdominal pain (17 Sep 2018 07:33)        HPI:  22 yoF w/ pmh of chronic abdominal pain, endometriosis s/p dx lap in 2015 and 2016, ovarian cysts, anxiety, and asthma presenting with RUQ pain, nausea vomiting for the past 3 days. Pt states that her chronic pain in RLQ but 3 days she started experiencing RUQ pain that is sharp in nature and radiates to chest. Poor PO intake since onset and pt has NBNB vomiting everytime she eats. No diarrhea. No fevers but felt cold at home.   ROS negative for headache, dizziness, SOB. Patient with chronic hx of urinary retention, recieved injections? for muscle spasticity.   GI hx significant for colonoscopies and endoscopies in the past showing  gastritis and colitis of unknown origin.   In Er, VSS. No leukocytosis. Ct abd showing possible appendicitis, patient seen by Surgery- per recs unlikely appedicitis can folowup opt. No gallstones on RUQ US. Seen by GYn, pain unlikely GNY in etiology. Recommended opt f/u (17 Sep 2018 03:13)  Paresthesias in all 4 extremities-with burning      Allergies  morphine (Rash)  sulfa drugs (Unknown)      Health Issues  ABDOMINAL PAIN  No pertinent family history in first degree relatives  Handoff  MEWS Score  Anxiety  Asthma  UTI (urinary tract infection)  Constipation  Parasite infection  Endometriosis  H pylori ulcer  UTI (urinary tract infection)  Constipation  Parasite infection  Endometriosis  Abdominal pain, unspecified abdominal location  Transition of care performed with sharing of clinical summary  Nutrition, metabolism, and development symptoms  Constipation  Anxiety  Bladder spasm  Endometriosis  Abdominal pain, unspecified abdominal location  H/O laparoscopy  No significant past surgical history  ABDOMINAL PAIN  90+  Endometriosis        FAMILY HISTORY:  No pertinent family history in first degree relatives      MEDICATIONS  (STANDING):  busPIRone 20 milliGRAM(s) Oral <User Schedule>  docusate sodium 100 milliGRAM(s) Oral two times a day  FLUoxetine 40 milliGRAM(s) Oral daily  sodium chloride 0.9%. 1000 milliLiter(s) (120 mL/Hr) IV Continuous <Continuous>    MEDICATIONS  (PRN):  diphenhydrAMINE   Injectable 12.5 milliGRAM(s) IV Push every 4 hours PRN Rash and/or Itching  HYDROmorphone  Injectable 0.5 milliGRAM(s) IV Push every 3 hours PRN Breakthrough Pain  HYDROmorphone  Injectable 1 milliGRAM(s) IV Push every 4 hours PRN Moderate Pain (4 - 6)      PAST MEDICAL & SURGICAL HISTORY:  Anxiety  Asthma  UTI (urinary tract infection): recurring for the past few months  Constipation  Parasite infection  Endometriosis  H pylori ulcer  H/O laparoscopy: for endometriosis      Labs                          12.0   5.3   )-----------( 202      ( 17 Sep 2018 07:09 )             33.8     09-17    141  |  102  |  5<L>  ----------------------------<  91  3.8   |  25  |  1.04    Ca    9.4      17 Sep 2018 07:09  Mg     1.9     09-17    TPro  7.5  /  Alb  4.8  /  TBili  0.5  /  DBili  x   /  AST  23  /  ALT  17  /  AlkPhos  68  09-16      Radiology:    Physical Exam    MENTAL STATUS  -Level of Consciousness- awake    Orientation- person, place time  Language- aphasia/ dysarthria- nl  Memory- recent and remote- nl      Cranial Nerve 1- 12  Pupils- equal and reactive  Eye movements- full  Facial - no asymmetry   Lower CN-nl    Gait and Station- nl    MOTOR  Upper-nl  Lower-nl    Reflexes- increase at KJ- toes downgoing    Sensation- no sensory level    Cerebellar- no tremors     vascular - no bruits    Assessment- Unlikely neuro deficit- with increase reflexes in the LE- rec- MRI thoracic spine     Sx of possible small fiber neuropathy- RF, SSA SSB, CPK, Lyme titer, HEENA, ANCA , Ganglionic ACH antibody     Plan

## 2018-09-17 NOTE — CONSULT NOTE ADULT - SUBJECTIVE AND OBJECTIVE BOX
21yo G0 presenting with nausea, vomiting and abdominal pain for few days.  She reports three days ago started vomiting and has not been able to tolerate anything by mouth without vomiting.  After persistently vomiting she then developed pain in the upper part of her abdomen, in the right upper quadrant of her abdomen and radiating up to her chest.  She also reports constipation.  She denies SOB, vaginal bleeding, abnormal vaginal discharge, diarrhea.  She denies fevers or chills.  She has a history of endometriosis and reports that this pain "is different than [her] endometriosis pain".  She has had 2 prior laparoscopic endometriosis excision procedures in  and 2016, as well as colonoscopies and endoscopies for further evaluation of chronic abdominal pain, constipation/diarrhea which revealed hemorrhagic gastritis and colitis. Does not menstruate secondary to continuous norethidrone use.     OB/GYN Hx: Never sexually active; h/o endometriosis with 2 prior endometriosis excisions in  and  (Dr. Willis); currently enrolled in pelvic physical therapy program for pelvic pain management and sees pain management specialist who prescribes fentanyl patch, oxycodone   PMHx: remote h/o campylobacter infection, hemorrhagic gastritis, ashtma   SHx: , 2016 diagnostic laparoscopy w/ endometriosis excision (with Dr. Willis and another OBGYN in )  Meds: oxycodone 15mg 5xdaily, fentanyl 12mcg patch every 2 days, medical marijuana, valium 20mg qhs, buspirone 20mg TID, norethidrone 5mg  Allergies: sulfa -- rash, morphine - rash     PHYSICAL EXAM:   Vital Signs Last 24 Hrs  T(C): 36.9 (17 Sep 2018 15:33), Max: 37.3 (17 Sep 2018 03:48)  T(F): 98.5 (17 Sep 2018 15:33), Max: 99.2 (17 Sep 2018 03:48)  HR: 57 (17 Sep 2018 15:33) (57 - 84)  BP: 116/73 (17 Sep 2018 15:33) (116/73 - 140/82)  BP(mean): --  RR: 17 (17 Sep 2018 15:33) (16 - 18)  SpO2: 98% (17 Sep 2018 15:33) (98% - 99%)    **************************  Constitutional: Alert & Oriented x3, No acute distress  Respiratory: regular work of breathing   Gastrointestinal: soft, mildly tender to palpation in RUQ and L/R LQ, positive bowel sounds, no rebound, no guarding   Extremities: no calf tenderness or swelling      LABS:                        12.0   5.3   )-----------( 202      ( 17 Sep 2018 07:09 )             33.8     -    141  |  102  |  5<L>  ----------------------------<  91  3.8   |  25  |  1.04    Ca    9.4      17 Sep 2018 07:09  Mg     1.9         TPro  7.5  /  Alb  4.8  /  TBili  0.5  /  DBili  x   /  AST  23  /  ALT  17  /  AlkPhos  68  09-16    PT/INR - ( 16 Sep 2018 16:34 )   PT: 12.5 sec;   INR: 1.12          PTT - ( 16 Sep 2018 16:34 )  PTT:31.1 sec  Urinalysis Basic - ( 16 Sep 2018 16:51 )    Color: Yellow / Appearance: Clear / S.015 / pH: x  Gluc: x / Ketone: 40 mg/dL  / Bili: Negative / Urobili: 0.2 E.U./dL   Blood: x / Protein: NEGATIVE mg/dL / Nitrite: NEGATIVE   Leuk Esterase: NEGATIVE / RBC: x / WBC x   Sq Epi: x / Non Sq Epi: x / Bacteria: x      RADIOLOGY & ADDITIONAL STUDIES:

## 2018-09-18 ENCOUNTER — RESULT REVIEW (OUTPATIENT)
Age: 23
End: 2018-09-18

## 2018-09-18 PROCEDURE — 76856 US EXAM PELVIC COMPLETE: CPT | Mod: 26

## 2018-09-18 PROCEDURE — 76830 TRANSVAGINAL US NON-OB: CPT | Mod: 26

## 2018-09-18 PROCEDURE — 72197 MRI PELVIS W/O & W/DYE: CPT | Mod: 26

## 2018-09-18 PROCEDURE — 72146 MRI CHEST SPINE W/O DYE: CPT | Mod: 26

## 2018-09-18 RX ORDER — HYDROMORPHONE HYDROCHLORIDE 2 MG/ML
1 INJECTION INTRAMUSCULAR; INTRAVENOUS; SUBCUTANEOUS ONCE
Qty: 0 | Refills: 0 | Status: DISCONTINUED | OUTPATIENT
Start: 2018-09-18 | End: 2018-09-18

## 2018-09-18 RX ORDER — HYDROMORPHONE HYDROCHLORIDE 2 MG/ML
0.5 INJECTION INTRAMUSCULAR; INTRAVENOUS; SUBCUTANEOUS
Qty: 0 | Refills: 0 | Status: DISCONTINUED | OUTPATIENT
Start: 2018-09-18 | End: 2018-09-18

## 2018-09-18 RX ORDER — ONDANSETRON 8 MG/1
4 TABLET, FILM COATED ORAL ONCE
Qty: 0 | Refills: 0 | Status: COMPLETED | OUTPATIENT
Start: 2018-09-18 | End: 2018-09-18

## 2018-09-18 RX ORDER — DIAZEPAM 5 MG
5 TABLET ORAL ONCE
Qty: 0 | Refills: 0 | Status: DISCONTINUED | OUTPATIENT
Start: 2018-09-18 | End: 2018-09-18

## 2018-09-18 RX ORDER — DOCUSATE SODIUM 100 MG
100 CAPSULE ORAL THREE TIMES A DAY
Qty: 0 | Refills: 0 | Status: DISCONTINUED | OUTPATIENT
Start: 2018-09-18 | End: 2018-09-25

## 2018-09-18 RX ORDER — HYDROMORPHONE HYDROCHLORIDE 2 MG/ML
2 INJECTION INTRAMUSCULAR; INTRAVENOUS; SUBCUTANEOUS EVERY 4 HOURS
Qty: 0 | Refills: 0 | Status: DISCONTINUED | OUTPATIENT
Start: 2018-09-18 | End: 2018-09-19

## 2018-09-18 RX ORDER — ACETAMINOPHEN 500 MG
975 TABLET ORAL EVERY 8 HOURS
Qty: 0 | Refills: 0 | Status: DISCONTINUED | OUTPATIENT
Start: 2018-09-18 | End: 2018-09-25

## 2018-09-18 RX ORDER — KETOROLAC TROMETHAMINE 30 MG/ML
30 SYRINGE (ML) INJECTION EVERY 6 HOURS
Qty: 0 | Refills: 0 | Status: DISCONTINUED | OUTPATIENT
Start: 2018-09-18 | End: 2018-09-21

## 2018-09-18 RX ORDER — BUPIVACAINE 13.3 MG/ML
20 INJECTION, SUSPENSION, LIPOSOMAL INFILTRATION ONCE
Qty: 0 | Refills: 0 | Status: DISCONTINUED | OUTPATIENT
Start: 2018-09-18 | End: 2018-09-18

## 2018-09-18 RX ORDER — METOCLOPRAMIDE HCL 10 MG
10 TABLET ORAL EVERY 6 HOURS
Qty: 0 | Refills: 0 | Status: DISCONTINUED | OUTPATIENT
Start: 2018-09-18 | End: 2018-09-19

## 2018-09-18 RX ORDER — DIAZEPAM 5 MG
15 TABLET ORAL ONCE
Qty: 0 | Refills: 0 | Status: DISCONTINUED | OUTPATIENT
Start: 2018-09-19 | End: 2018-09-19

## 2018-09-18 RX ORDER — ONDANSETRON 8 MG/1
4 TABLET, FILM COATED ORAL EVERY 6 HOURS
Qty: 0 | Refills: 0 | Status: DISCONTINUED | OUTPATIENT
Start: 2018-09-18 | End: 2018-09-25

## 2018-09-18 RX ORDER — SODIUM CHLORIDE 9 MG/ML
1000 INJECTION, SOLUTION INTRAVENOUS
Qty: 0 | Refills: 0 | Status: DISCONTINUED | OUTPATIENT
Start: 2018-09-18 | End: 2018-09-20

## 2018-09-18 RX ORDER — SIMETHICONE 80 MG/1
80 TABLET, CHEWABLE ORAL EVERY 6 HOURS
Qty: 0 | Refills: 0 | Status: DISCONTINUED | OUTPATIENT
Start: 2018-09-18 | End: 2018-09-25

## 2018-09-18 RX ADMIN — Medication 40 MILLIGRAM(S): at 14:11

## 2018-09-18 RX ADMIN — HYDROMORPHONE HYDROCHLORIDE 1 MILLIGRAM(S): 2 INJECTION INTRAMUSCULAR; INTRAVENOUS; SUBCUTANEOUS at 21:55

## 2018-09-18 RX ADMIN — Medication 5 MILLIGRAM(S): at 21:42

## 2018-09-18 RX ADMIN — Medication 975 MILLIGRAM(S): at 23:58

## 2018-09-18 RX ADMIN — SODIUM CHLORIDE 120 MILLILITER(S): 9 INJECTION INTRAMUSCULAR; INTRAVENOUS; SUBCUTANEOUS at 04:05

## 2018-09-18 RX ADMIN — HYDROMORPHONE HYDROCHLORIDE 0.5 MILLIGRAM(S): 2 INJECTION INTRAMUSCULAR; INTRAVENOUS; SUBCUTANEOUS at 20:20

## 2018-09-18 RX ADMIN — HYDROMORPHONE HYDROCHLORIDE 1 MILLIGRAM(S): 2 INJECTION INTRAMUSCULAR; INTRAVENOUS; SUBCUTANEOUS at 15:11

## 2018-09-18 RX ADMIN — HYDROMORPHONE HYDROCHLORIDE 1 MILLIGRAM(S): 2 INJECTION INTRAMUSCULAR; INTRAVENOUS; SUBCUTANEOUS at 14:11

## 2018-09-18 RX ADMIN — Medication 12.5 MILLIGRAM(S): at 20:54

## 2018-09-18 RX ADMIN — Medication 100 MILLIGRAM(S): at 05:45

## 2018-09-18 RX ADMIN — HYDROMORPHONE HYDROCHLORIDE 2 MILLIGRAM(S): 2 INJECTION INTRAMUSCULAR; INTRAVENOUS; SUBCUTANEOUS at 23:58

## 2018-09-18 RX ADMIN — HYDROMORPHONE HYDROCHLORIDE 1 MILLIGRAM(S): 2 INJECTION INTRAMUSCULAR; INTRAVENOUS; SUBCUTANEOUS at 21:35

## 2018-09-18 RX ADMIN — HYDROMORPHONE HYDROCHLORIDE 0.5 MILLIGRAM(S): 2 INJECTION INTRAMUSCULAR; INTRAVENOUS; SUBCUTANEOUS at 06:27

## 2018-09-18 RX ADMIN — HYDROMORPHONE HYDROCHLORIDE 0.5 MILLIGRAM(S): 2 INJECTION INTRAMUSCULAR; INTRAVENOUS; SUBCUTANEOUS at 21:20

## 2018-09-18 RX ADMIN — Medication 12.5 MILLIGRAM(S): at 14:40

## 2018-09-18 RX ADMIN — HYDROMORPHONE HYDROCHLORIDE 1 MILLIGRAM(S): 2 INJECTION INTRAMUSCULAR; INTRAVENOUS; SUBCUTANEOUS at 04:17

## 2018-09-18 RX ADMIN — HYDROMORPHONE HYDROCHLORIDE 1 MILLIGRAM(S): 2 INJECTION INTRAMUSCULAR; INTRAVENOUS; SUBCUTANEOUS at 04:02

## 2018-09-18 RX ADMIN — HYDROMORPHONE HYDROCHLORIDE 0.5 MILLIGRAM(S): 2 INJECTION INTRAMUSCULAR; INTRAVENOUS; SUBCUTANEOUS at 20:35

## 2018-09-18 RX ADMIN — HYDROMORPHONE HYDROCHLORIDE 0.5 MILLIGRAM(S): 2 INJECTION INTRAMUSCULAR; INTRAVENOUS; SUBCUTANEOUS at 21:05

## 2018-09-18 RX ADMIN — HYDROMORPHONE HYDROCHLORIDE 1 MILLIGRAM(S): 2 INJECTION INTRAMUSCULAR; INTRAVENOUS; SUBCUTANEOUS at 08:32

## 2018-09-18 RX ADMIN — HYDROMORPHONE HYDROCHLORIDE 0.5 MILLIGRAM(S): 2 INJECTION INTRAMUSCULAR; INTRAVENOUS; SUBCUTANEOUS at 20:06

## 2018-09-18 RX ADMIN — HYDROMORPHONE HYDROCHLORIDE 0.5 MILLIGRAM(S): 2 INJECTION INTRAMUSCULAR; INTRAVENOUS; SUBCUTANEOUS at 16:35

## 2018-09-18 RX ADMIN — SIMETHICONE 80 MILLIGRAM(S): 80 TABLET, CHEWABLE ORAL at 23:59

## 2018-09-18 RX ADMIN — HYDROMORPHONE HYDROCHLORIDE 1 MILLIGRAM(S): 2 INJECTION INTRAMUSCULAR; INTRAVENOUS; SUBCUTANEOUS at 09:09

## 2018-09-18 RX ADMIN — HYDROMORPHONE HYDROCHLORIDE 0.5 MILLIGRAM(S): 2 INJECTION INTRAMUSCULAR; INTRAVENOUS; SUBCUTANEOUS at 00:35

## 2018-09-18 RX ADMIN — ONDANSETRON 4 MILLIGRAM(S): 8 TABLET, FILM COATED ORAL at 14:20

## 2018-09-18 NOTE — PROGRESS NOTE ADULT - PROBLEM SELECTOR PLAN 2
Hx of endometriosis s/p 2 diag laps. Says current pain is different from endometriosis pain. Seen by GYN in ER, recommend opt followup   - At home takes Norethidrone 5mg=medication not on formlary, check if patient has home medication and continue inpt. Hx of endometriosis s/p 2 diag laps. Says current pain is different from endometriosis pain. Seen by GYN in ER, recommend opt followup   - At home takes Norethidrone 5mg=medication not on formlary, patient to bring from home

## 2018-09-18 NOTE — PRE-OP CHECKLIST - TEMPERATURE IN FAHRENHEIT (DEGREES F)
Medication(s) Requested: LORazepam (ATIVAN) 1 MG tablet  Last office visit: 5/4/18  Last refill: 5/22/18  Is the patient due for refill of this medication(s): Yes  PDMP review: Criteria met. Forwarded to Physician/OLIVER for signature.        99.1

## 2018-09-18 NOTE — PROGRESS NOTE ADULT - ASSESSMENT
22 yoF w/ pmh of chronic abdominal pain, endometriosis s/p dx lap in 2015 and 2016, ovarian cysts, anxiety, and asthma presenting with RUQ pain, nausea vomiting for the past 3 days. 22 yoF w/ pmh of chronic abdominal pain, endometriosis s/p dx lap in 2015 and 2016, ovarian cysts, anxiety, and asthma presenting with RUQ pain, nausea vomiting for the past 3 days with unknown etiology

## 2018-09-18 NOTE — PROGRESS NOTE ADULT - SUBJECTIVE AND OBJECTIVE BOX
OVERNIGHT EVENTS:  No acute events overnight.     SUBJECTIVE:  Patient seen and examined at bedside.  Reports continued RUQ pain and required breakthrough pain meds once overnight.  Has been able to tolerate some PO intake with chicken broth.    Vital Signs Last 12 Hrs  T(F): 99.4 (18 @ 09:06), Max: 99.4 (18 @ 09:06)  HR: 58 (18 @ 09:06) (58 - 67)  BP: 145/88 (18 @ 09:06) (114/73 - 145/88)  BP(mean): --  RR: 19 (18 @ 09:06) (17 - 19)  SpO2: 98% (18 @ 09:06) (98% - 98%)  I&O's Summary    17 Sep 2018 07:01  -  18 Sep 2018 07:00  --------------------------------------------------------  IN: 1440 mL / OUT: 0 mL / NET: 1440 mL        PHYSICAL EXAM:  Constitutional: NAD, comfortable in bed.  HEENT: NC/AT, PERRLA, EOMI, no conjunctival pallor or scleral icterus  Neck: Supple, no JVD  Respiratory: CTA B/L. No w/r/r.   Cardiovascular: RRR, normal S1 and S2, no m/r/g.   Gastrointestinal: Soft, RUQ tenderness to light and deep palpations, no rebound or guarding. RLQ and LLQ tenderness and pain (patient reports these are chronic and her normal pain)  Extremities: wwp; no cyanosis, clubbing or edema.   Vascular: Pulses equal and strong throughout.   Neurological: AAOx3, no CN deficits, strength and sensation intact throughout.   Skin: No gross skin abnormalities or rashes        LABS:                        12.0   5.3   )-----------( 202      ( 17 Sep 2018 07:09 )             33.8     -    141  |  102  |  5<L>  ----------------------------<  91  3.8   |  25  |  1.04    Ca    9.4      17 Sep 2018 07:09  Mg     1.9     -    TPro  7.5  /  Alb  4.8  /  TBili  0.5  /  DBili  x   /  AST  23  /  ALT  17  /  AlkPhos  68  -    PT/INR - ( 16 Sep 2018 16:34 )   PT: 12.5 sec;   INR: 1.12          PTT - ( 16 Sep 2018 16:34 )  PTT:31.1 sec  Urinalysis Basic - ( 16 Sep 2018 16:51 )    Color: Yellow / Appearance: Clear / S.015 / pH: x  Gluc: x / Ketone: 40 mg/dL  / Bili: Negative / Urobili: 0.2 E.U./dL   Blood: x / Protein: NEGATIVE mg/dL / Nitrite: NEGATIVE   Leuk Esterase: NEGATIVE / RBC: x / WBC x   Sq Epi: x / Non Sq Epi: x / Bacteria: x          RADIOLOGY & ADDITIONAL TESTS:    MEDICATIONS  (STANDING):  busPIRone 20 milliGRAM(s) Oral <User Schedule>  docusate sodium 100 milliGRAM(s) Oral two times a day  fentaNYL   Patch  12 MICROgram(s)/Hr. 1 Patch Transdermal every 48 hours  FLUoxetine 40 milliGRAM(s) Oral daily  sodium chloride 0.9%. 1000 milliLiter(s) (120 mL/Hr) IV Continuous <Continuous>    MEDICATIONS  (PRN):  diphenhydrAMINE   Injectable 12.5 milliGRAM(s) IV Push every 4 hours PRN Rash and/or Itching  HYDROmorphone  Injectable 0.5 milliGRAM(s) IV Push every 3 hours PRN Breakthrough Pain  HYDROmorphone  Injectable 1 milliGRAM(s) IV Push every 4 hours PRN Moderate Pain (4 - 6) Transfer Note from [facility] to GYN  Hospital Course  22 yoF w/ pmh of chronic abdominal pain, endometriosis s/p dx lap in  and , ovarian cysts, anxiety, and asthma presented on  with RUQ pain, nausea vomiting for the past 3 days. On admission VSS, no leukocytosis.  CT was performed and demonstrated possible appendicitis, surgery was consulted but felt she did not have appendicitis and operation was deferred.  No gallstones were seen on RUQ US. She was seen by neurology and found to have sxs of possible small fiber neuropathy.  Thoracic MRI was normal.  She was evaluated by gynecology and MRI and US of pelvis were performed.  Patient to be transferred to gynecology for surgery for possible endometriosis.    OVERNIGHT EVENTS:  No acute events overnight.     SUBJECTIVE:  Patient seen and examined at bedside.  Reports continued RUQ pain and required breakthrough pain meds once overnight.  Has been able to tolerate some PO intake with chicken broth.    Vital Signs Last 12 Hrs  T(F): 99.4 (18 @ 09:06), Max: 99.4 (18 @ 09:06)  HR: 58 (18 @ 09:06) (58 - 67)  BP: 145/88 (18 @ 09:06) (114/73 - 145/88)  BP(mean): --  RR: 19 (18 @ 09:06) (17 - 19)  SpO2: 98% (18 @ 09:06) (98% - 98%)  I&O's Summary    17 Sep 2018 07:01  -  18 Sep 2018 07:00  --------------------------------------------------------  IN: 1440 mL / OUT: 0 mL / NET: 1440 mL        PHYSICAL EXAM:  Constitutional: NAD, comfortable in bed.  HEENT: NC/AT, PERRLA, EOMI, no conjunctival pallor or scleral icterus  Neck: Supple, no JVD  Respiratory: CTA B/L. No w/r/r.   Cardiovascular: RRR, normal S1 and S2, no m/r/g.   Gastrointestinal: Soft, RUQ tenderness to light and deep palpations, no rebound or guarding. RLQ and LLQ tenderness and pain (patient reports these are chronic and her normal pain)  Extremities: wwp; no cyanosis, clubbing or edema.   Vascular: Pulses equal and strong throughout.   Neurological: AAOx3, no CN deficits, strength and sensation intact throughout.   Skin: No gross skin abnormalities or rashes        LABS:                        12.0   5.3   )-----------( 202      ( 17 Sep 2018 07:09 )             33.8         141  |  102  |  5<L>  ----------------------------<  91  3.8   |  25  |  1.04    Ca    9.4      17 Sep 2018 07:09  Mg     1.9         TPro  7.5  /  Alb  4.8  /  TBili  0.5  /  DBili  x   /  AST  23  /  ALT  17  /  AlkPhos  68  -    PT/INR - ( 16 Sep 2018 16:34 )   PT: 12.5 sec;   INR: 1.12          PTT - ( 16 Sep 2018 16:34 )  PTT:31.1 sec  Urinalysis Basic - ( 16 Sep 2018 16:51 )    Color: Yellow / Appearance: Clear / S.015 / pH: x  Gluc: x / Ketone: 40 mg/dL  / Bili: Negative / Urobili: 0.2 E.U./dL   Blood: x / Protein: NEGATIVE mg/dL / Nitrite: NEGATIVE   Leuk Esterase: NEGATIVE / RBC: x / WBC x   Sq Epi: x / Non Sq Epi: x / Bacteria: x          RADIOLOGY & ADDITIONAL TESTS:    MEDICATIONS  (STANDING):  busPIRone 20 milliGRAM(s) Oral <User Schedule>  docusate sodium 100 milliGRAM(s) Oral two times a day  fentaNYL   Patch  12 MICROgram(s)/Hr. 1 Patch Transdermal every 48 hours  FLUoxetine 40 milliGRAM(s) Oral daily  sodium chloride 0.9%. 1000 milliLiter(s) (120 mL/Hr) IV Continuous <Continuous>    MEDICATIONS  (PRN):  diphenhydrAMINE   Injectable 12.5 milliGRAM(s) IV Push every 4 hours PRN Rash and/or Itching  HYDROmorphone  Injectable 0.5 milliGRAM(s) IV Push every 3 hours PRN Breakthrough Pain  HYDROmorphone  Injectable 1 milliGRAM(s) IV Push every 4 hours PRN Moderate Pain (4 - 6)

## 2018-09-18 NOTE — BRIEF OPERATIVE NOTE - OPERATION/FINDINGS
dilated distal appendix. normal uterus, ovaries and fallopian tubes. both ureters visualized. normal appearing bladder on cystoscopy. b/l ureteral stents placed and removed at end of procedure.

## 2018-09-18 NOTE — PROGRESS NOTE ADULT - PROBLEM SELECTOR PLAN 7
1) PCP Contacted on Admission: (Y/N) --> Name & Phone #: Dr. Wilson  2) Date of Contact with PCP:  3) PCP Contacted at Discharge: (Y/N)  4) Summary of Handoff Given to PCP:   5) Post-Discharge Appointment Date and Location: 1) PCP Contacted on Admission: Y --> Name & Phone #: Dr. Wilson  2) Date of Contact with PCP: Dr. Wilson following patient while inpatient  3) PCP Contacted at Discharge: (Y/N)  4) Summary of Handoff Given to PCP:   5) Post-Discharge Appointment Date and Location:

## 2018-09-18 NOTE — PROGRESS NOTE ADULT - SUBJECTIVE AND OBJECTIVE BOX
Neurology Follow up note    Name  ANU SAN    HPI:  22 yoF w/ pmh of chronic abdominal pain, endometriosis s/p dx lap in 2015 and 2016, ovarian cysts, anxiety, and asthma presenting with RUQ pain, nausea vomiting for the past 3 days. Pt states that her chronic pain in RLQ but 3 days she started experiencing RUQ pain that is sharp in nature and radiates to chest. Poor PO intake since onset and pt has NBNB vomiting everytime she eats. No diarrhea. No fevers but felt cold at home.   ROS negative for headache, dizziness, SOB. Patient with chronic hx of urinary retention, recieved injections? for muscle spasticity.   GI hx significant for colonoscopies and endoscopies in the past showing  gastritis and colitis of unknown origin.   In Er, VSS. No leukocytosis. Ct abd showing possible appendicitis, patient seen by Surgery- per recs unlikely appedicitis can folowup opt. No gallstones on RUQ US. Seen by GYn, pain unlikely GNY in etiology. Recommended opt f/u (17 Sep 2018 03:13)      Interval History - abdominal pain - no new weakness in the UE/ LE        REVIEW OF SYSTEMS    Vital Signs Last 24 Hrs  T(C): 36.9 (18 Sep 2018 05:36), Max: 37.2 (17 Sep 2018 20:30)  T(F): 98.5 (18 Sep 2018 05:36), Max: 99 (17 Sep 2018 20:30)  HR: 67 (18 Sep 2018 05:36) (57 - 72)  BP: 114/73 (18 Sep 2018 05:36) (114/73 - 126/72)  BP(mean): --  RR: 17 (18 Sep 2018 05:36) (16 - 18)  SpO2: 98% (18 Sep 2018 05:36) (98% - 99%)    Physical Exam-     Mental Status- awake and alert    Cranial Nerves- nl    Gait and station- n/a    Motor- no focal weakness    Reflexes- increase KJ- toes silent    Sensation- no sensory level    Coordination- no tremors    Vascular - no bruits    Medications  busPIRone 20 milliGRAM(s) Oral <User Schedule>  diphenhydrAMINE   Injectable 12.5 milliGRAM(s) IV Push every 4 hours PRN  docusate sodium 100 milliGRAM(s) Oral two times a day  fentaNYL   Patch  12 MICROgram(s)/Hr. 1 Patch Transdermal every 48 hours  FLUoxetine 40 milliGRAM(s) Oral daily  HYDROmorphone  Injectable 0.5 milliGRAM(s) IV Push every 3 hours PRN  HYDROmorphone  Injectable 1 milliGRAM(s) IV Push every 4 hours PRN  sodium chloride 0.9%. 1000 milliLiter(s) IV Continuous <Continuous>      Lab      Radiology    Assessment- Abdominal pain    Plan MRI thoracic spine

## 2018-09-18 NOTE — PROGRESS NOTE ADULT - PROBLEM SELECTOR PLAN 4
States taking Fluoxetine 40, Buspirone 20, Valium 20, [id number] prn, Prozac 90q weekly.    continued home Fluoxetine and Buspirone.   - Please obtain med rec and start home meds as tolerated States taking Fluoxetine 40, Buspirone 20, Valium 20, [id number] prn, Prozac 90q weekly.   - continued home Fluoxetine and Buspirone

## 2018-09-18 NOTE — PROGRESS NOTE ADULT - SUBJECTIVE AND OBJECTIVE BOX
GYN POC   Patient seen at bedside.  Pain has not been somewhat controlled with dilaudid but the patient is complaining of breakthrough pain and pressure as if her bladder is full. Tolerating sips.  No OOB yet. Awaiting TOV.    Denies CP, palpitations, SOB, fever, chills, nausea, vomiting.    Vital Signs Last 24 Hrs  T(C): 36.9 (18 Sep 2018 23:59), Max: 37.4 (18 Sep 2018 09:06)  T(F): 98.4 (18 Sep 2018 23:59), Max: 99.4 (18 Sep 2018 09:06)  HR: 91 (18 Sep 2018 23:59) (58 - 108)  BP: 136/76 (18 Sep 2018 23:59) (114/73 - 170/86)  BP(mean): 98 (18 Sep 2018 21:45) (3 - 117)  RR: 17 (18 Sep 2018 23:59) (14 - 27)  SpO2: 97% (18 Sep 2018 23:59) (92% - 100%)    Physical Exam:  Gen: No Acute Distress  Pulm: regular work of breathing, no respiratory distress  GI: soft, mildly distended, appropriately tender, no rebound, no guarding.  Dressing C/D/I  : Zurita in place  Ext: SCDs in place, wnl    I&O's Summary    17 Sep 2018 07:01  -  18 Sep 2018 07:00  --------------------------------------------------------  IN: 2880 mL / OUT: 0 mL / NET: 2880 mL    18 Sep 2018 07:01  -  19 Sep 2018 01:15  --------------------------------------------------------  IN: 375 mL / OUT: 1200 mL / NET: -825 mL      MEDICATIONS  (STANDING):  acetaminophen   Tablet .. 975 milliGRAM(s) Oral every 8 hours  busPIRone 20 milliGRAM(s) Oral <User Schedule>  docusate sodium 100 milliGRAM(s) Oral three times a day  fentaNYL   Patch  12 MICROgram(s)/Hr. 1 Patch Transdermal every 48 hours  FLUoxetine 40 milliGRAM(s) Oral daily  ketorolac   Injectable 30 milliGRAM(s) IV Push every 6 hours  lactated ringers. 1000 milliLiter(s) (125 mL/Hr) IV Continuous <Continuous>  simethicone 80 milliGRAM(s) Chew every 6 hours    MEDICATIONS  (PRN):  diazepam    Tablet 15 milliGRAM(s) Oral once PRN anxiety/insomia  diphenhydrAMINE   Injectable 12.5 milliGRAM(s) IV Push every 4 hours PRN Rash and/or Itching  HYDROmorphone  Injectable 2 milliGRAM(s) IV Push every 4 hours PRN Severe Pain (7 - 10)  metoclopramide Injectable 10 milliGRAM(s) IV Push every 6 hours PRN Nausea and/or Vomiting  ondansetron Injectable 4 milliGRAM(s) IV Push every 6 hours PRN Postoperative Nausea and/or Vomiting if reglan is inadequate    Allergies    morphine (Rash)  sulfa drugs (Unknown)    Intolerances        LABS:                        12.0   5.3   )-----------( 202      ( 17 Sep 2018 07:09 )             33.8     09-17    141  |  102  |  5<L>  ----------------------------<  91  3.8   |  25  |  1.04    Ca    9.4      17 Sep 2018 07:09  Mg     1.9     09-17

## 2018-09-18 NOTE — PROGRESS NOTE ADULT - ASSESSMENT
22y Female POD#0  s/p diagnostic laparoscopy and l/s appendectomy                                        1. Neuro/Pain:  on toradol ATC, tylenol ATC, dilaudid prn and fentanyl patch; patient reports typically needing 2mg dilaudid for postoperative pain management and valium prn insomnia at home; plan to place hardwick cather to decompress bladder for better pain control and administer increased dose of dilaudid if necessary  2  CV:   VS per routine  3. Pulm: Encourage ISS  4. GI: AAT  5. :  TOV at midnight, may place harwdick at the time for bladder decompression  6. Heme: AM CBC  7. ID: --  8. DVT ppx: SCDs  9. Dispo: per attending

## 2018-09-18 NOTE — PROGRESS NOTE ADULT - SUBJECTIVE AND OBJECTIVE BOX
I examined the patient today, reviewed the lab data, xrays and additional studies. Additionally I have reviewed the notes and assessments by the residents and consultants.   At this point I agree with the assessments and plan.  I would also advise close observation, and supportive care.  Preparing for gyn procedure severe pain

## 2018-09-18 NOTE — BRIEF OPERATIVE NOTE - PRE-OP DX
Appendicitis  09/18/2018  rule out appendicitis  Active  Toro Cote  Endometriosis  09/18/2018  rule out endometriosis  Active  Toro Cote

## 2018-09-18 NOTE — BRIEF OPERATIVE NOTE - PROCEDURE
<<-----Click on this checkbox to enter Procedure Cystoscopy  09/18/2018    Active  [id number]  Laparoscopic appendectomy concurrent with major procedure  09/18/2018    Active  [id number]  Diagnostic laparoscopy by gynecology  09/18/2018  rule out endometriosis  Active  [id number]

## 2018-09-18 NOTE — PROGRESS NOTE ADULT - PROBLEM SELECTOR PLAN 1
VSS, no leukocytosis. Ct abd showing possible appendicitis, patient seen by Surgery- per recs unlikely appedicitis can folowup opt. No gallstones on RUQ US.   - Unclear etiology of pain. No signs of infection  - MRI thoracic spine unremarkable  - MRI pelvis and TVUS pending  - Neuro following, appreciate their recs  -Gyn following, appreciate their recs  - will continue with pain control with Dilaudid 1mg q4 PRN, 0.5mg q3h for breakthrough pain. To be given with Benadryl given hx of pruritis. At home takes Oxycodone and uses Fentanyl patch  -At home takes Linzess, non formulary medication- please confirm if patient had home medication and start while inpt VSS, no leukocytosis. Ct abd showing possible appendicitis, patient seen by Surgery- per recs unlikely appedicitis can folowup opt. No gallstones on RUQ US.   - Unclear etiology of pain. No signs of infection  - MRI thoracic spine unremarkable  - MRI pelvis and TVUS pending  - Neuro following, appreciate their recs  - Gyn following, appreciate their recs  - Possible laparoscopy today for endometriosis and appendectomy today  - will continue with pain control with Dilaudid 1mg q4 PRN, 0.5mg q3h for breakthrough pain. To be given with Benadryl given hx of pruritis. At home takes Oxycodone and uses Fentanyl patch  -At home takes Linzess, non formulary medication- please confirm if patient had home medication and start while inpt

## 2018-09-18 NOTE — PROGRESS NOTE ADULT - SUBJECTIVE AND OBJECTIVE BOX
Called to bedside for patient in pain. States she knows she is retaining urine so a hardwick catheter was placed, 1100cc concentrated urine came out. To be left in place. Patient states her pain will not go away unless she gets 2mg IV dilaudid. We agreed we will try this to break the pain cycle tonight. Valium 15mg po ordered to be given later if patient still has insomnia, she received 5mg already but states her home dose is 20mg qhs. She is taking her own norethindrone. Discussed plan with patient's nurse regarding giving the dilaudid now.

## 2018-09-19 LAB
ANION GAP SERPL CALC-SCNC: 14 MMOL/L — SIGNIFICANT CHANGE UP (ref 5–17)
APTT BLD: 28.4 SEC — SIGNIFICANT CHANGE UP (ref 27.5–37.4)
BUN SERPL-MCNC: 5 MG/DL — LOW (ref 7–23)
CALCIUM SERPL-MCNC: 9.4 MG/DL — SIGNIFICANT CHANGE UP (ref 8.4–10.5)
CHLORIDE SERPL-SCNC: 100 MMOL/L — SIGNIFICANT CHANGE UP (ref 96–108)
CK SERPL-CCNC: 144 U/L — SIGNIFICANT CHANGE UP (ref 25–170)
CO2 SERPL-SCNC: 24 MMOL/L — SIGNIFICANT CHANGE UP (ref 22–31)
CREAT SERPL-MCNC: 0.81 MG/DL — SIGNIFICANT CHANGE UP (ref 0.5–1.3)
DSDNA AB FLD-ACNC: <0.2 AI — SIGNIFICANT CHANGE UP
ENA SS-A AB FLD IA-ACNC: <0.2 AI — SIGNIFICANT CHANGE UP
EXTRA GREEN TOP TUBE: SIGNIFICANT CHANGE UP
GLUCOSE SERPL-MCNC: 131 MG/DL — HIGH (ref 70–99)
HCT VFR BLD CALC: 34.2 % — LOW (ref 34.5–45)
HGB BLD-MCNC: 12 G/DL — SIGNIFICANT CHANGE UP (ref 11.5–15.5)
MCHC RBC-ENTMCNC: 29.9 PG — SIGNIFICANT CHANGE UP (ref 27–34)
MCHC RBC-ENTMCNC: 35.1 G/DL — SIGNIFICANT CHANGE UP (ref 32–36)
MCV RBC AUTO: 85.1 FL — SIGNIFICANT CHANGE UP (ref 80–100)
PLATELET # BLD AUTO: 207 K/UL — SIGNIFICANT CHANGE UP (ref 150–400)
POTASSIUM SERPL-MCNC: 4.1 MMOL/L — SIGNIFICANT CHANGE UP (ref 3.5–5.3)
POTASSIUM SERPL-SCNC: 4.1 MMOL/L — SIGNIFICANT CHANGE UP (ref 3.5–5.3)
RBC # BLD: 4.02 M/UL — SIGNIFICANT CHANGE UP (ref 3.8–5.2)
RBC # FLD: 11.8 % — SIGNIFICANT CHANGE UP (ref 10.3–16.9)
SODIUM SERPL-SCNC: 138 MMOL/L — SIGNIFICANT CHANGE UP (ref 135–145)
WBC # BLD: 7 K/UL — SIGNIFICANT CHANGE UP (ref 3.8–10.5)
WBC # FLD AUTO: 7 K/UL — SIGNIFICANT CHANGE UP (ref 3.8–10.5)

## 2018-09-19 RX ORDER — HYDROMORPHONE HYDROCHLORIDE 2 MG/ML
0.5 INJECTION INTRAMUSCULAR; INTRAVENOUS; SUBCUTANEOUS
Qty: 0 | Refills: 0 | Status: DISCONTINUED | OUTPATIENT
Start: 2018-09-19 | End: 2018-09-19

## 2018-09-19 RX ORDER — FENTANYL CITRATE 50 UG/ML
1 INJECTION INTRAVENOUS
Qty: 0 | Refills: 0 | Status: DISCONTINUED | OUTPATIENT
Start: 2018-09-19 | End: 2018-09-25

## 2018-09-19 RX ORDER — HYDROMORPHONE HYDROCHLORIDE 2 MG/ML
1.5 INJECTION INTRAMUSCULAR; INTRAVENOUS; SUBCUTANEOUS EVERY 4 HOURS
Qty: 0 | Refills: 0 | Status: DISCONTINUED | OUTPATIENT
Start: 2018-09-19 | End: 2018-09-24

## 2018-09-19 RX ORDER — DIAZEPAM 5 MG
5 TABLET ORAL EVERY 8 HOURS
Qty: 0 | Refills: 0 | Status: DISCONTINUED | OUTPATIENT
Start: 2018-09-19 | End: 2018-09-25

## 2018-09-19 RX ORDER — HYDROMORPHONE HYDROCHLORIDE 2 MG/ML
1 INJECTION INTRAMUSCULAR; INTRAVENOUS; SUBCUTANEOUS EVERY 4 HOURS
Qty: 0 | Refills: 0 | Status: DISCONTINUED | OUTPATIENT
Start: 2018-09-19 | End: 2018-09-19

## 2018-09-19 RX ORDER — HYDROMORPHONE HYDROCHLORIDE 2 MG/ML
0.25 INJECTION INTRAMUSCULAR; INTRAVENOUS; SUBCUTANEOUS ONCE
Qty: 0 | Refills: 0 | Status: DISCONTINUED | OUTPATIENT
Start: 2018-09-19 | End: 2018-09-19

## 2018-09-19 RX ADMIN — Medication 975 MILLIGRAM(S): at 06:47

## 2018-09-19 RX ADMIN — Medication 12.5 MILLIGRAM(S): at 16:12

## 2018-09-19 RX ADMIN — HYDROMORPHONE HYDROCHLORIDE 2 MILLIGRAM(S): 2 INJECTION INTRAMUSCULAR; INTRAVENOUS; SUBCUTANEOUS at 00:20

## 2018-09-19 RX ADMIN — Medication 30 MILLIGRAM(S): at 14:30

## 2018-09-19 RX ADMIN — Medication 975 MILLIGRAM(S): at 23:05

## 2018-09-19 RX ADMIN — Medication 100 MILLIGRAM(S): at 14:22

## 2018-09-19 RX ADMIN — Medication 20 MILLIGRAM(S): at 17:05

## 2018-09-19 RX ADMIN — SIMETHICONE 80 MILLIGRAM(S): 80 TABLET, CHEWABLE ORAL at 11:10

## 2018-09-19 RX ADMIN — SIMETHICONE 80 MILLIGRAM(S): 80 TABLET, CHEWABLE ORAL at 23:05

## 2018-09-19 RX ADMIN — Medication 5 MILLIGRAM(S): at 10:41

## 2018-09-19 RX ADMIN — Medication 975 MILLIGRAM(S): at 16:00

## 2018-09-19 RX ADMIN — FENTANYL CITRATE 1 PATCH: 50 INJECTION INTRAVENOUS at 11:10

## 2018-09-19 RX ADMIN — Medication 30 MILLIGRAM(S): at 19:35

## 2018-09-19 RX ADMIN — HYDROMORPHONE HYDROCHLORIDE 0.25 MILLIGRAM(S): 2 INJECTION INTRAMUSCULAR; INTRAVENOUS; SUBCUTANEOUS at 11:10

## 2018-09-19 RX ADMIN — Medication 975 MILLIGRAM(S): at 00:55

## 2018-09-19 RX ADMIN — HYDROMORPHONE HYDROCHLORIDE 1.5 MILLIGRAM(S): 2 INJECTION INTRAMUSCULAR; INTRAVENOUS; SUBCUTANEOUS at 12:15

## 2018-09-19 RX ADMIN — HYDROMORPHONE HYDROCHLORIDE 1.5 MILLIGRAM(S): 2 INJECTION INTRAMUSCULAR; INTRAVENOUS; SUBCUTANEOUS at 20:44

## 2018-09-19 RX ADMIN — Medication 30 MILLIGRAM(S): at 14:22

## 2018-09-19 RX ADMIN — HYDROMORPHONE HYDROCHLORIDE 2 MILLIGRAM(S): 2 INJECTION INTRAMUSCULAR; INTRAVENOUS; SUBCUTANEOUS at 04:16

## 2018-09-19 RX ADMIN — Medication 12.5 MILLIGRAM(S): at 01:02

## 2018-09-19 RX ADMIN — Medication 12.5 MILLIGRAM(S): at 20:44

## 2018-09-19 RX ADMIN — HYDROMORPHONE HYDROCHLORIDE 1.5 MILLIGRAM(S): 2 INJECTION INTRAMUSCULAR; INTRAVENOUS; SUBCUTANEOUS at 16:20

## 2018-09-19 RX ADMIN — FENTANYL CITRATE 1 PATCH: 50 INJECTION INTRAVENOUS at 11:14

## 2018-09-19 RX ADMIN — Medication 30 MILLIGRAM(S): at 02:35

## 2018-09-19 RX ADMIN — SODIUM CHLORIDE 125 MILLILITER(S): 9 INJECTION, SOLUTION INTRAVENOUS at 06:47

## 2018-09-19 RX ADMIN — Medication 5 MILLIGRAM(S): at 19:02

## 2018-09-19 RX ADMIN — Medication 975 MILLIGRAM(S): at 15:16

## 2018-09-19 RX ADMIN — HYDROMORPHONE HYDROCHLORIDE 1.5 MILLIGRAM(S): 2 INJECTION INTRAMUSCULAR; INTRAVENOUS; SUBCUTANEOUS at 16:08

## 2018-09-19 RX ADMIN — SIMETHICONE 80 MILLIGRAM(S): 80 TABLET, CHEWABLE ORAL at 05:20

## 2018-09-19 RX ADMIN — HYDROMORPHONE HYDROCHLORIDE 1.5 MILLIGRAM(S): 2 INJECTION INTRAMUSCULAR; INTRAVENOUS; SUBCUTANEOUS at 12:01

## 2018-09-19 RX ADMIN — Medication 40 MILLIGRAM(S): at 11:10

## 2018-09-19 RX ADMIN — Medication 100 MILLIGRAM(S): at 23:05

## 2018-09-19 RX ADMIN — Medication 30 MILLIGRAM(S): at 07:34

## 2018-09-19 RX ADMIN — Medication 975 MILLIGRAM(S): at 07:34

## 2018-09-19 RX ADMIN — HYDROMORPHONE HYDROCHLORIDE 0.25 MILLIGRAM(S): 2 INJECTION INTRAMUSCULAR; INTRAVENOUS; SUBCUTANEOUS at 11:25

## 2018-09-19 RX ADMIN — HYDROMORPHONE HYDROCHLORIDE 1 MILLIGRAM(S): 2 INJECTION INTRAMUSCULAR; INTRAVENOUS; SUBCUTANEOUS at 08:24

## 2018-09-19 RX ADMIN — HYDROMORPHONE HYDROCHLORIDE 1 MILLIGRAM(S): 2 INJECTION INTRAMUSCULAR; INTRAVENOUS; SUBCUTANEOUS at 08:06

## 2018-09-19 RX ADMIN — HYDROMORPHONE HYDROCHLORIDE 2 MILLIGRAM(S): 2 INJECTION INTRAMUSCULAR; INTRAVENOUS; SUBCUTANEOUS at 04:35

## 2018-09-19 RX ADMIN — Medication 100 MILLIGRAM(S): at 05:20

## 2018-09-19 RX ADMIN — SIMETHICONE 80 MILLIGRAM(S): 80 TABLET, CHEWABLE ORAL at 17:05

## 2018-09-19 RX ADMIN — Medication 12.5 MILLIGRAM(S): at 05:20

## 2018-09-19 RX ADMIN — Medication 12.5 MILLIGRAM(S): at 12:36

## 2018-09-19 RX ADMIN — Medication 30 MILLIGRAM(S): at 07:08

## 2018-09-19 RX ADMIN — Medication 30 MILLIGRAM(S): at 02:15

## 2018-09-19 RX ADMIN — ONDANSETRON 4 MILLIGRAM(S): 8 TABLET, FILM COATED ORAL at 17:43

## 2018-09-19 NOTE — PROGRESS NOTE ADULT - ASSESSMENT
21yo POD1/HD3 s/p diagnostic laparoscopy, endometriosis excision, appendectomy and cystoscopy. Pt's pain is controlled with new regimen today. I ambulated with the patient in the hallway- no issues. Encouraged patient to sit up on chair in room.     Neuro: Toradol and Tylenol standing, Dilaudid 1.5mg PRN. Fentanyl patch 25mcg every 48hrs   Anxiety: continue Prozac, Buspar and Diazepam TID   Cardio: No issues   Pulm: saturating at 99% room air. Incentive spirometer at least 10 times per hour while awake   GI: Reg diet, Zofran prn. Simethicone and Colace    : Zurita catheter removed at 1430- TOV    Activity: ambulate as tolerated   DVT ppx: SCDs   Follow up pain management, medicine recommendations   Social work- follow up drug rehab placement outpatient

## 2018-09-19 NOTE — PROGRESS NOTE ADULT - SUBJECTIVE AND OBJECTIVE BOX
Interval events: hardwick reinserted overnight due to bladder distension and pain.     Pt evaluated at bedside.  She reports severe pain in the bladder that improved after hardwick placement and pain at surgical sites that improved with dilaudid. She is tolerating ice chips, has not yet passed flatus or ambulated. She denies nausea/ vomiting.    T(C): 37.2 (09-19-18 @ 05:33), Max: 37.2 (09-19-18 @ 05:33)  HR: 84 (09-19-18 @ 05:33) (84 - 108)  BP: 117/73 (09-19-18 @ 05:33) (117/73 - 170/86)  RR: 16 (09-19-18 @ 05:33) (14 - 27)  SpO2: 96% (09-19-18 @ 05:33) (92% - 100%)  GA: NAD  CV: RRR, no MRG  Pulm: CTAB  Abd: +BS, soft, mildly distended, diffusely tender to palpation, no rebound or guarding, incision c/d/i  Extrem: SCDs in place, no calf tenderness bilaterally    09-18 @ 07:01  -  09-19 @ 06:57  --------------------------------------------------------  IN: 1500 mL / OUT: 1650 mL / NET: -150 mL                              12.0   5.3   )-----------( 202      ( 17 Sep 2018 07:09 )             33.8     09-17    141  |  102  |  5<L>  ----------------------------<  91  3.8   |  25  |  1.04    Ca    9.4      17 Sep 2018 07:09  Mg     1.9     09-17

## 2018-09-19 NOTE — PROGRESS NOTE ADULT - SUBJECTIVE AND OBJECTIVE BOX
I examined the patient today, reviewed the lab data, xrays and additional studies. Additionally I have reviewed the notes and assessments by the residents and consultants.   At this point I agree with the assessments and plan.  I would also advise close observation, and supportive care.  Pain mgt and DR farah

## 2018-09-19 NOTE — PROGRESS NOTE ADULT - SUBJECTIVE AND OBJECTIVE BOX
Pt seen and examined at bedside. Pt states pain controlled with medication. She had some regular food and tolerated. Pt states she is anxious about getting up from bed and is worried she may not be able to urinate after the hardwick catheter is removed.   Pt denies fever, chills, chest pain, SOB, nausea, vomiting, lightheadedness, or dizziness.      T(F): 99.1 (09-19-18 @ 13:01), Max: 99.1 (09-19-18 @ 13:01)  HR: 90 (09-19-18 @ 13:01) (84 - 108)  BP: 128/79 (09-19-18 @ 13:01) (117/73 - 170/86)  RR: 17 (09-19-18 @ 13:01) (14 - 27)  SpO2: 99% (09-19-18 @ 13:01) (92% - 100%)  Wt(kg): --  I&O's Summary    18 Sep 2018 07:01  -  19 Sep 2018 07:00  --------------------------------------------------------  IN: 1500 mL / OUT: 1650 mL / NET: -150 mL    19 Sep 2018 07:01  -  19 Sep 2018 16:02  --------------------------------------------------------  IN: 240 mL / OUT: 1750 mL / NET: -1510 mL    MEDICATIONS  (STANDING):  acetaminophen   Tablet .. 975 milliGRAM(s) Oral every 8 hours  busPIRone 20 milliGRAM(s) Oral <User Schedule>  diazepam    Tablet 5 milliGRAM(s) Oral every 8 hours  docusate sodium 100 milliGRAM(s) Oral three times a day  fentaNYL   Patch  25 MICROgram(s)/Hr. 1 Patch Transdermal every 48 hours  FLUoxetine 40 milliGRAM(s) Oral daily  ketorolac   Injectable 30 milliGRAM(s) IV Push every 6 hours  lactated ringers. 1000 milliLiter(s) (125 mL/Hr) IV Continuous <Continuous>  simethicone 80 milliGRAM(s) Chew every 6 hours    MEDICATIONS  (PRN):  diphenhydrAMINE   Injectable 12.5 milliGRAM(s) IV Push every 4 hours PRN Rash and/or Itching  HYDROmorphone  Injectable 1.5 milliGRAM(s) IV Push every 4 hours PRN Breakthrough pain  ondansetron Injectable 4 milliGRAM(s) IV Push every 6 hours PRN Postoperative Nausea and/or Vomiting if reglan is inadequate    Physical Exam:  Constitutional: NAD  Abdomen: incision sites clean, dry, intact. Soft, mildly tender, nondistended, no guarding, no rebound, +bowel sounds  Extremities: no lower extremity edema or calve tenderness. SCDs in place     LABS:                        12.0   7.0   )-----------( 207      ( 19 Sep 2018 07:00 )             34.2     09-19    138  |  100  |  5<L>  ----------------------------<  131<H>  4.1   |  24  |  0.81    Ca    9.4      19 Sep 2018 06:59      PTT - ( 19 Sep 2018 07:00 )  PTT:28.4 sec

## 2018-09-19 NOTE — PROGRESS NOTE ADULT - ASSESSMENT
23yo G0 w/ h/o of chroni pelvic pain, endometriosis s/p dx lap in 2015 and 2016, ovarian cysts, anxiety, and asthma presenting with RUQ pain now POD1 s/p dx laparoscopy, appendectomy, endometriosis excision, cystoscopy with stent placement.      1. Neuro/Pain:  toradol, tylenol, dilaudid for breakthrough  2  CV:   VS per routine  3. Pulm: Encourage ISS  4. GI: ADAT  5. :  remove hardwick at noon for TOV  6. Heme: f/u AM CBC  7. ID: --  8. DVT ppx: SCDs, ambulation  9. Dispo: Likely POD#1 or 2

## 2018-09-19 NOTE — PROGRESS NOTE ADULT - SUBJECTIVE AND OBJECTIVE BOX
Pt seen and examined   coverage for Dr. Wilson  c/o surgical pain  hardwick back in due to retention    REVIEW OF SYSTEMS:  Constitutional: No fever,   Cardiovascular: No chest pain, palpitations, dizziness or leg swelling  Gastrointestinal: + abdominal pain. No nausea, vomiting  Skin: No itching, burning, rashes or lesions       MEDICATIONS:  MEDICATIONS  (STANDING):  acetaminophen   Tablet .. 975 milliGRAM(s) Oral every 8 hours  busPIRone 20 milliGRAM(s) Oral <User Schedule>  diazepam    Tablet 5 milliGRAM(s) Oral every 8 hours  docusate sodium 100 milliGRAM(s) Oral three times a day  fentaNYL   Patch  25 MICROgram(s)/Hr. 1 Patch Transdermal every 48 hours  FLUoxetine 40 milliGRAM(s) Oral daily  HYDROmorphone  Injectable 0.25 milliGRAM(s) IV Push once  ketorolac   Injectable 30 milliGRAM(s) IV Push every 6 hours  lactated ringers. 1000 milliLiter(s) (125 mL/Hr) IV Continuous <Continuous>  simethicone 80 milliGRAM(s) Chew every 6 hours    MEDICATIONS  (PRN):  diphenhydrAMINE   Injectable 12.5 milliGRAM(s) IV Push every 4 hours PRN Rash and/or Itching  HYDROmorphone  Injectable 1.5 milliGRAM(s) IV Push every 4 hours PRN Breakthrough pain  metoclopramide Injectable 10 milliGRAM(s) IV Push every 6 hours PRN Nausea and/or Vomiting  ondansetron Injectable 4 milliGRAM(s) IV Push every 6 hours PRN Postoperative Nausea and/or Vomiting if reglan is inadequate      Allergies    morphine (Rash)  sulfa drugs (Unknown)    Intolerances        Vital Signs Last 24 Hrs  T(C): 37.2 (19 Sep 2018 05:33), Max: 37.3 (18 Sep 2018 15:59)  T(F): 99 (19 Sep 2018 05:33), Max: 99.1 (18 Sep 2018 15:59)  HR: 84 (19 Sep 2018 05:33) (70 - 108)  BP: 117/73 (19 Sep 2018 05:33) (117/73 - 170/86)  BP(mean): 98 (18 Sep 2018 21:45) (3 - 117)  RR: 16 (19 Sep 2018 05:33) (14 - 27)  SpO2: 96% (19 Sep 2018 05:33) (92% - 100%)    09-18 @ 07:01  -  09-19 @ 07:00  --------------------------------------------------------  IN: 1500 mL / OUT: 1650 mL / NET: -150 mL    09-19 @ 07:01 - 09-19 @ 11:10  --------------------------------------------------------  IN: 0 mL / OUT: 1300 mL / NET: -1300 mL        PHYSICAL EXAM:    General: Well developed; well nourished; in no acute distress  HEENT: MMM, conjunctiva and sclera clear  Lungs: clear  Heart: regular  Gastrointestinal: flat non-distended; Normal bowel sounds;   Skin: Warm and dry. No obvious rash    LABS:      CBC Full  -  ( 19 Sep 2018 07:00 )  WBC Count : 7.0 K/uL  Hemoglobin : 12.0 g/dL  Hematocrit : 34.2 %  Platelet Count - Automated : 207 K/uL  Mean Cell Volume : 85.1 fL  Mean Cell Hemoglobin : 29.9 pg  Mean Cell Hemoglobin Concentration : 35.1 g/dL  Auto Neutrophil # : x  Auto Lymphocyte # : x  Auto Monocyte # : x  Auto Eosinophil # : x  Auto Basophil # : x  Auto Neutrophil % : x  Auto Lymphocyte % : x  Auto Monocyte % : x  Auto Eosinophil % : x  Auto Basophil % : x    09-19    138  |  100  |  5<L>  ----------------------------<  131<H>  4.1   |  24  |  0.81    Ca    9.4      19 Sep 2018 06:59      PTT - ( 19 Sep 2018 07:00 )  PTT:28.4 sec                  RADIOLOGY & ADDITIONAL STUDIES (The following images were personally reviewed):

## 2018-09-19 NOTE — PROGRESS NOTE ADULT - SUBJECTIVE AND OBJECTIVE BOX
Patient is well known to me as an outpatient for chronic pelvic pain secondary to endometriosis.  Her outpatient pain regimen was fentanyl 12 mcg q 48 hrs and oxycodone 15 mg q 4-6 hrs prn.    She is currently POD#1 1 s/p dx laparoscopy, appendectomy, endometriosis excision, cystoscopy with stent placement.        She currently c/o severe abdominal pain without nausea/vomiting and "bladder spasms"      Her current pain regimen is fentanyl patch 12 mcg/hr q 48 hrs and dilaudid 1 mg ivp q 4 hrs prn.      Recommend:    1) Increase fentanyl patch 12 mcg/hr q 48 hrs to 25 mcg/hr q 48 hrs    2) increase hydromorphone to 1.5 mg IVP q 4 hrs prn breakthrough severe pain    3) diazepam 5 mg tid around the clock

## 2018-09-20 LAB
ANA TITR SER: NEGATIVE — SIGNIFICANT CHANGE UP
AUTO DIFF PNL BLD: NEGATIVE — SIGNIFICANT CHANGE UP
B BURGDOR C6 AB SER-ACNC: NEGATIVE — SIGNIFICANT CHANGE UP
B BURGDOR IGG+IGM SER-ACNC: 0.03 INDEX — SIGNIFICANT CHANGE UP (ref 0.01–0.89)
C-ANCA SER-ACNC: NEGATIVE — SIGNIFICANT CHANGE UP
GLUCOSE BLDC GLUCOMTR-MCNC: 111 MG/DL — HIGH (ref 70–99)
P-ANCA SER-ACNC: NEGATIVE — SIGNIFICANT CHANGE UP
RHEUMATOID FACT SERPL-ACNC: <10 IU/ML — SIGNIFICANT CHANGE UP (ref 0–13)

## 2018-09-20 RX ORDER — SODIUM CHLORIDE 9 MG/ML
1000 INJECTION, SOLUTION INTRAVENOUS
Qty: 0 | Refills: 0 | Status: DISCONTINUED | OUTPATIENT
Start: 2018-09-20 | End: 2018-09-25

## 2018-09-20 RX ADMIN — Medication 12.5 MILLIGRAM(S): at 13:09

## 2018-09-20 RX ADMIN — ONDANSETRON 4 MILLIGRAM(S): 8 TABLET, FILM COATED ORAL at 09:42

## 2018-09-20 RX ADMIN — Medication 30 MILLIGRAM(S): at 14:55

## 2018-09-20 RX ADMIN — Medication 30 MILLIGRAM(S): at 02:36

## 2018-09-20 RX ADMIN — HYDROMORPHONE HYDROCHLORIDE 1.5 MILLIGRAM(S): 2 INJECTION INTRAMUSCULAR; INTRAVENOUS; SUBCUTANEOUS at 18:23

## 2018-09-20 RX ADMIN — Medication 5 MILLIGRAM(S): at 07:08

## 2018-09-20 RX ADMIN — Medication 100 MILLIGRAM(S): at 22:32

## 2018-09-20 RX ADMIN — Medication 100 MILLIGRAM(S): at 14:29

## 2018-09-20 RX ADMIN — HYDROMORPHONE HYDROCHLORIDE 1.5 MILLIGRAM(S): 2 INJECTION INTRAMUSCULAR; INTRAVENOUS; SUBCUTANEOUS at 09:06

## 2018-09-20 RX ADMIN — HYDROMORPHONE HYDROCHLORIDE 1.5 MILLIGRAM(S): 2 INJECTION INTRAMUSCULAR; INTRAVENOUS; SUBCUTANEOUS at 21:20

## 2018-09-20 RX ADMIN — Medication 5 MILLIGRAM(S): at 22:32

## 2018-09-20 RX ADMIN — HYDROMORPHONE HYDROCHLORIDE 1.5 MILLIGRAM(S): 2 INJECTION INTRAMUSCULAR; INTRAVENOUS; SUBCUTANEOUS at 13:09

## 2018-09-20 RX ADMIN — Medication 30 MILLIGRAM(S): at 07:09

## 2018-09-20 RX ADMIN — ONDANSETRON 4 MILLIGRAM(S): 8 TABLET, FILM COATED ORAL at 00:06

## 2018-09-20 RX ADMIN — HYDROMORPHONE HYDROCHLORIDE 1.5 MILLIGRAM(S): 2 INJECTION INTRAMUSCULAR; INTRAVENOUS; SUBCUTANEOUS at 00:51

## 2018-09-20 RX ADMIN — HYDROMORPHONE HYDROCHLORIDE 1.5 MILLIGRAM(S): 2 INJECTION INTRAMUSCULAR; INTRAVENOUS; SUBCUTANEOUS at 04:30

## 2018-09-20 RX ADMIN — Medication 30 MILLIGRAM(S): at 19:13

## 2018-09-20 RX ADMIN — HYDROMORPHONE HYDROCHLORIDE 1.5 MILLIGRAM(S): 2 INJECTION INTRAMUSCULAR; INTRAVENOUS; SUBCUTANEOUS at 14:25

## 2018-09-20 RX ADMIN — Medication 12.5 MILLIGRAM(S): at 21:20

## 2018-09-20 RX ADMIN — Medication 100 MILLIGRAM(S): at 07:09

## 2018-09-20 RX ADMIN — Medication 20 MILLIGRAM(S): at 17:34

## 2018-09-20 RX ADMIN — Medication 975 MILLIGRAM(S): at 07:09

## 2018-09-20 RX ADMIN — Medication 12.5 MILLIGRAM(S): at 00:51

## 2018-09-20 RX ADMIN — Medication 40 MILLIGRAM(S): at 11:37

## 2018-09-20 RX ADMIN — HYDROMORPHONE HYDROCHLORIDE 1.5 MILLIGRAM(S): 2 INJECTION INTRAMUSCULAR; INTRAVENOUS; SUBCUTANEOUS at 08:55

## 2018-09-20 RX ADMIN — Medication 12.5 MILLIGRAM(S): at 08:55

## 2018-09-20 RX ADMIN — SIMETHICONE 80 MILLIGRAM(S): 80 TABLET, CHEWABLE ORAL at 07:09

## 2018-09-20 RX ADMIN — Medication 12.5 MILLIGRAM(S): at 17:18

## 2018-09-20 RX ADMIN — HYDROMORPHONE HYDROCHLORIDE 1.5 MILLIGRAM(S): 2 INJECTION INTRAMUSCULAR; INTRAVENOUS; SUBCUTANEOUS at 21:50

## 2018-09-20 RX ADMIN — Medication 12.5 MILLIGRAM(S): at 04:29

## 2018-09-20 RX ADMIN — Medication 30 MILLIGRAM(S): at 14:29

## 2018-09-20 RX ADMIN — ONDANSETRON 4 MILLIGRAM(S): 8 TABLET, FILM COATED ORAL at 17:29

## 2018-09-20 RX ADMIN — Medication 30 MILLIGRAM(S): at 19:27

## 2018-09-20 RX ADMIN — SIMETHICONE 80 MILLIGRAM(S): 80 TABLET, CHEWABLE ORAL at 17:34

## 2018-09-20 RX ADMIN — SIMETHICONE 80 MILLIGRAM(S): 80 TABLET, CHEWABLE ORAL at 11:37

## 2018-09-20 RX ADMIN — HYDROMORPHONE HYDROCHLORIDE 1.5 MILLIGRAM(S): 2 INJECTION INTRAMUSCULAR; INTRAVENOUS; SUBCUTANEOUS at 17:18

## 2018-09-20 RX ADMIN — Medication 5 MILLIGRAM(S): at 14:29

## 2018-09-20 NOTE — PROGRESS NOTE ADULT - SUBJECTIVE AND OBJECTIVE BOX
Patient reports pain is 4/10 VAS with current pain regimen without adverse side effects.  Main complaint is urinary retention and incisional pain.    Current pain regimen:    fentanyl patch 25 mcg/hr q 48  hydromorphone 1.5 mg IVP q 4 hrs  diazepam 5 mg tid    Rec: continue current pain meds  plan for discharge: will send pain medication rx with conversion of hydromorphone IV to p.o.                               f/u as outpatient 1 week post discharge

## 2018-09-20 NOTE — PROGRESS NOTE ADULT - SUBJECTIVE AND OBJECTIVE BOX
Pt seen and examined at bedside. She has ambulated in the hallway today and is passing flatus. She states pain is "somewhat controlled" and is feeling anxious. She is drinking water but not having regular food only little bites because she states she feels nauseous.    Pt denies fever, chills, chest pain, SOB, vomiting, lightheadedness, or dizziness.      T(F): 98.1 (09-20-18 @ 17:51), Max: 99.2 (09-20-18 @ 13:11)  HR: 104 (09-20-18 @ 17:51) (84 - 104)  BP: 157/87 (09-20-18 @ 17:51) (124/75 - 157/87)  RR: 16 (09-20-18 @ 17:51) (16 - 17)  SpO2: 95% (09-20-18 @ 17:51) (95% - 100%)  Wt(kg): --  I&O's Summary    19 Sep 2018 07:01  -  20 Sep 2018 07:00  --------------------------------------------------------  IN: 2940 mL / OUT: 2650 mL / NET: 290 mL    20 Sep 2018 07:01  -  20 Sep 2018 18:01  --------------------------------------------------------  IN: 1240 mL / OUT: 950 mL / NET: 290 mL    MEDICATIONS  (STANDING):  acetaminophen   Tablet .. 975 milliGRAM(s) Oral every 8 hours  busPIRone 20 milliGRAM(s) Oral <User Schedule>  diazepam    Tablet 5 milliGRAM(s) Oral every 8 hours  docusate sodium 100 milliGRAM(s) Oral three times a day  fentaNYL   Patch  25 MICROgram(s)/Hr. 1 Patch Transdermal every 48 hours  FLUoxetine 40 milliGRAM(s) Oral daily  ketorolac   Injectable 30 milliGRAM(s) IV Push every 6 hours  lactated ringers. 1000 milliLiter(s) (75 mL/Hr) IV Continuous <Continuous>  simethicone 80 milliGRAM(s) Chew every 6 hours    MEDICATIONS  (PRN):  diphenhydrAMINE   Injectable 12.5 milliGRAM(s) IV Push every 4 hours PRN Rash and/or Itching  HYDROmorphone  Injectable 1.5 milliGRAM(s) IV Push every 4 hours PRN Breakthrough pain  ondansetron Injectable 4 milliGRAM(s) IV Push every 6 hours PRN Postoperative Nausea and/or Vomiting if reglan is inadequate    Physical Exam:  Constitutional: NAD  Pulmonary: clear to auscultation bilaterally   Cardiovascular: Regular rate and rhythm   Abdomen: incision sites clean, dry, intact. Soft, mildly tender, nondistended, no guarding, no rebound, +bowel sounds  Extremities: no lower extremity edema or calve tenderness. SCDs in place     LABS:                        12.0   7.0   )-----------( 207      ( 19 Sep 2018 07:00 )             34.2     09-19    138  |  100  |  5<L>  ----------------------------<  131<H>  4.1   |  24  |  0.81    Ca    9.4      19 Sep 2018 06:59      PTT - ( 19 Sep 2018 07:00 )  PTT:28.4 sec      RADIOLOGY & ADDITIONAL TESTS:

## 2018-09-20 NOTE — PROGRESS NOTE ADULT - SUBJECTIVE AND OBJECTIVE BOX
Neurology Follow up note    Name  ANU SAN    HPI:  22 yoF w/ pmh of chronic abdominal pain, endometriosis s/p dx lap in 2015 and 2016, ovarian cysts, anxiety, and asthma presenting with RUQ pain, nausea vomiting for the past 3 days. Pt states that her chronic pain in RLQ but 3 days she started experiencing RUQ pain that is sharp in nature and radiates to chest. Poor PO intake since onset and pt has NBNB vomiting everytime she eats. No diarrhea. No fevers but felt cold at home.   ROS negative for headache, dizziness, SOB. Patient with chronic hx of urinary retention, recieved injections? for muscle spasticity.   GI hx significant for colonoscopies and endoscopies in the past showing  gastritis and colitis of unknown origin.   In Er, VSS. No leukocytosis. Ct abd showing possible appendicitis, patient seen by Surgery- per recs unlikely appedicitis can folowup opt. No gallstones on RUQ US. Seen by GYn, pain unlikely GNY in etiology. Recommended opt f/u (17 Sep 2018 03:13)      Interval History - back pain improved - no new weakness UE/ LE        REVIEW OF SYSTEMS    Vital Signs Last 24 Hrs  T(C): 36.1 (20 Sep 2018 05:20), Max: 37.3 (19 Sep 2018 13:01)  T(F): 97 (20 Sep 2018 05:20), Max: 99.1 (19 Sep 2018 13:01)  HR: 84 (20 Sep 2018 05:20) (82 - 93)  BP: 124/75 (20 Sep 2018 05:20) (124/70 - 138/83)  BP(mean): --  RR: 17 (20 Sep 2018 05:20) (17 - 18)  SpO2: 98% (20 Sep 2018 05:20) (97% - 100%)    Physical Exam-     Mental Status- awake and alert    Cranial Nerves- full EOM    Gait and station- n/a    Motor- moves all 4 extremities    Reflexes- intact    Sensation- no sensory level    Coordination- no tremors    Vascular - no bruits    Medications  acetaminophen   Tablet .. 975 milliGRAM(s) Oral every 8 hours  busPIRone 20 milliGRAM(s) Oral <User Schedule>  diazepam    Tablet 5 milliGRAM(s) Oral every 8 hours  diphenhydrAMINE   Injectable 12.5 milliGRAM(s) IV Push every 4 hours PRN  docusate sodium 100 milliGRAM(s) Oral three times a day  fentaNYL   Patch  25 MICROgram(s)/Hr. 1 Patch Transdermal every 48 hours  FLUoxetine 40 milliGRAM(s) Oral daily  HYDROmorphone  Injectable 1.5 milliGRAM(s) IV Push every 4 hours PRN  ketorolac   Injectable 30 milliGRAM(s) IV Push every 6 hours  lactated ringers. 1000 milliLiter(s) IV Continuous <Continuous>  ondansetron Injectable 4 milliGRAM(s) IV Push every 6 hours PRN  simethicone 80 milliGRAM(s) Chew every 6 hours      Lab      Radiology    Assessment- No evidence of myelopathy    Plan If bladder symptoms persist - urology consult

## 2018-09-20 NOTE — PROGRESS NOTE ADULT - ASSESSMENT
23yo POD2/HD4 s/p diagnostic laparoscopy, endometriosis excision, appendectomy and cystoscopy. Pt is hemodynamically stable.     Neuro: Toradol and Tylenol standing, Dilaudid 1.5mg PRN. Fentanyl patch 25mcg every 48hrs   Anxiety: continue Prozac, Buspar and Diazepam TID   Cardio: No issues   Pulm: saturating at % room air. Incentive spirometer at least 10 times per hour while awake   GI: Reg diet, Zofran prn. Simethicone and Colace    : Hardwick#3- plan for hardwick removal and TOV tomorrow     Activity: ambulate as tolerated   DVT ppx: SCDs   Follow up pain management, medicine recommendations   Social work- follow up drug rehab placement outpatient

## 2018-09-20 NOTE — PROGRESS NOTE ADULT - ASSESSMENT
22y Female POD#1  s/p dx l/s, appendectomy, endo excision, cystoscopy                                        1. Neuro/Pain:  toradol ATC, tylenol ATC, dilaudid 1.5mg q4 prn BT, buspar, valium prn  2  CV:   VS per routine  3. Pulm: Encourage ISS  4. GI: Reg as tolerated  5. :  hardwick  6. Heme: Stable  7. ID: --  8. DVT ppx: SCDs, ambulation  9. Dispo: Likely POD#2 or 3

## 2018-09-20 NOTE — PROGRESS NOTE ADULT - SUBJECTIVE AND OBJECTIVE BOX
Interval events: patient failed TOV overnight, hardwick #3 replaced.    Pt evaluated at bedside.  She reports pain is well controlled. She is not tolerating PO intake. She reports getting nauseated when trying to eat, only tolerating sips of water. She is passing flatus. She is ambulating.      T(C): 36.1 (09-20-18 @ 05:20), Max: 36.8 (09-19-18 @ 20:40)  HR: 84 (09-20-18 @ 05:20) (84 - 93)  BP: 124/75 (09-20-18 @ 05:20) (124/75 - 138/83)  RR: 17 (09-20-18 @ 05:20) (17 - 17)  SpO2: 98% (09-20-18 @ 05:20) (98% - 100%)  GA: NAD  CV: RRR, no MRG  Pulm: CTAB  Abd: +BS, soft, NTND, no rebound or guarding, incisions c/d/i  Extrem: no calf tenderness bilaterally    09-19 @ 07:01  -  09-20 @ 07:00  --------------------------------------------------------  IN: 2940 mL / OUT: 2650 mL / NET: 290 mL                              12.0   7.0   )-----------( 207      ( 19 Sep 2018 07:00 )             34.2     09-19    138  |  100  |  5<L>  ----------------------------<  131<H>  4.1   |  24  |  0.81    Ca    9.4      19 Sep 2018 06:59

## 2018-09-21 ENCOUNTER — TRANSCRIPTION ENCOUNTER (OUTPATIENT)
Age: 23
End: 2018-09-21

## 2018-09-21 LAB
BLD GP AB SCN SERPL QL: NEGATIVE — SIGNIFICANT CHANGE UP
HCT VFR BLD CALC: 29.4 % — LOW (ref 34.5–45)
HGB BLD-MCNC: 10.3 G/DL — LOW (ref 11.5–15.5)
MCHC RBC-ENTMCNC: 30.7 PG — SIGNIFICANT CHANGE UP (ref 27–34)
MCHC RBC-ENTMCNC: 35 G/DL — SIGNIFICANT CHANGE UP (ref 32–36)
MCV RBC AUTO: 87.5 FL — SIGNIFICANT CHANGE UP (ref 80–100)
PLATELET # BLD AUTO: 179 K/UL — SIGNIFICANT CHANGE UP (ref 150–400)
RBC # BLD: 3.36 M/UL — LOW (ref 3.8–5.2)
RBC # FLD: 12 % — SIGNIFICANT CHANGE UP (ref 10.3–16.9)
RH IG SCN BLD-IMP: POSITIVE — SIGNIFICANT CHANGE UP
WBC # BLD: 4.8 K/UL — SIGNIFICANT CHANGE UP (ref 3.8–10.5)
WBC # FLD AUTO: 4.8 K/UL — SIGNIFICANT CHANGE UP (ref 3.8–10.5)

## 2018-09-21 RX ORDER — SENNA PLUS 8.6 MG/1
1 TABLET ORAL DAILY
Qty: 0 | Refills: 0 | Status: DISCONTINUED | OUTPATIENT
Start: 2018-09-21 | End: 2018-09-25

## 2018-09-21 RX ORDER — POLYETHYLENE GLYCOL 3350 17 G/17G
17 POWDER, FOR SOLUTION ORAL DAILY
Qty: 0 | Refills: 0 | Status: DISCONTINUED | OUTPATIENT
Start: 2018-09-21 | End: 2018-09-25

## 2018-09-21 RX ADMIN — Medication 100 MILLIGRAM(S): at 13:06

## 2018-09-21 RX ADMIN — HYDROMORPHONE HYDROCHLORIDE 1.5 MILLIGRAM(S): 2 INJECTION INTRAMUSCULAR; INTRAVENOUS; SUBCUTANEOUS at 01:59

## 2018-09-21 RX ADMIN — HYDROMORPHONE HYDROCHLORIDE 1.5 MILLIGRAM(S): 2 INJECTION INTRAMUSCULAR; INTRAVENOUS; SUBCUTANEOUS at 17:39

## 2018-09-21 RX ADMIN — SIMETHICONE 80 MILLIGRAM(S): 80 TABLET, CHEWABLE ORAL at 11:18

## 2018-09-21 RX ADMIN — Medication 100 MILLIGRAM(S): at 06:43

## 2018-09-21 RX ADMIN — HYDROMORPHONE HYDROCHLORIDE 1.5 MILLIGRAM(S): 2 INJECTION INTRAMUSCULAR; INTRAVENOUS; SUBCUTANEOUS at 21:49

## 2018-09-21 RX ADMIN — Medication 12.5 MILLIGRAM(S): at 05:26

## 2018-09-21 RX ADMIN — Medication 12.5 MILLIGRAM(S): at 22:01

## 2018-09-21 RX ADMIN — Medication 5 MILLIGRAM(S): at 06:43

## 2018-09-21 RX ADMIN — Medication 975 MILLIGRAM(S): at 15:43

## 2018-09-21 RX ADMIN — HYDROMORPHONE HYDROCHLORIDE 1.5 MILLIGRAM(S): 2 INJECTION INTRAMUSCULAR; INTRAVENOUS; SUBCUTANEOUS at 13:34

## 2018-09-21 RX ADMIN — HYDROMORPHONE HYDROCHLORIDE 1.5 MILLIGRAM(S): 2 INJECTION INTRAMUSCULAR; INTRAVENOUS; SUBCUTANEOUS at 19:22

## 2018-09-21 RX ADMIN — FENTANYL CITRATE 1 PATCH: 50 INJECTION INTRAVENOUS at 11:10

## 2018-09-21 RX ADMIN — SIMETHICONE 80 MILLIGRAM(S): 80 TABLET, CHEWABLE ORAL at 17:39

## 2018-09-21 RX ADMIN — SODIUM CHLORIDE 75 MILLILITER(S): 9 INJECTION, SOLUTION INTRAVENOUS at 21:39

## 2018-09-21 RX ADMIN — Medication 30 MILLIGRAM(S): at 08:16

## 2018-09-21 RX ADMIN — Medication 20 MILLIGRAM(S): at 19:19

## 2018-09-21 RX ADMIN — Medication 30 MILLIGRAM(S): at 08:36

## 2018-09-21 RX ADMIN — Medication 975 MILLIGRAM(S): at 23:54

## 2018-09-21 RX ADMIN — Medication 100 MILLIGRAM(S): at 21:38

## 2018-09-21 RX ADMIN — SIMETHICONE 80 MILLIGRAM(S): 80 TABLET, CHEWABLE ORAL at 06:43

## 2018-09-21 RX ADMIN — HYDROMORPHONE HYDROCHLORIDE 1.5 MILLIGRAM(S): 2 INJECTION INTRAMUSCULAR; INTRAVENOUS; SUBCUTANEOUS at 14:30

## 2018-09-21 RX ADMIN — SIMETHICONE 80 MILLIGRAM(S): 80 TABLET, CHEWABLE ORAL at 23:54

## 2018-09-21 RX ADMIN — Medication 975 MILLIGRAM(S): at 08:16

## 2018-09-21 RX ADMIN — Medication 5 MILLIGRAM(S): at 21:38

## 2018-09-21 RX ADMIN — Medication 12.5 MILLIGRAM(S): at 13:34

## 2018-09-21 RX ADMIN — POLYETHYLENE GLYCOL 3350 17 GRAM(S): 17 POWDER, FOR SOLUTION ORAL at 17:47

## 2018-09-21 RX ADMIN — ONDANSETRON 4 MILLIGRAM(S): 8 TABLET, FILM COATED ORAL at 05:26

## 2018-09-21 RX ADMIN — HYDROMORPHONE HYDROCHLORIDE 1.5 MILLIGRAM(S): 2 INJECTION INTRAMUSCULAR; INTRAVENOUS; SUBCUTANEOUS at 10:40

## 2018-09-21 RX ADMIN — HYDROMORPHONE HYDROCHLORIDE 1.5 MILLIGRAM(S): 2 INJECTION INTRAMUSCULAR; INTRAVENOUS; SUBCUTANEOUS at 01:26

## 2018-09-21 RX ADMIN — Medication 30 MILLIGRAM(S): at 13:06

## 2018-09-21 RX ADMIN — Medication 5 MILLIGRAM(S): at 13:06

## 2018-09-21 RX ADMIN — SENNA PLUS 1 TABLET(S): 8.6 TABLET ORAL at 17:47

## 2018-09-21 RX ADMIN — HYDROMORPHONE HYDROCHLORIDE 1.5 MILLIGRAM(S): 2 INJECTION INTRAMUSCULAR; INTRAVENOUS; SUBCUTANEOUS at 09:28

## 2018-09-21 RX ADMIN — Medication 30 MILLIGRAM(S): at 15:10

## 2018-09-21 RX ADMIN — HYDROMORPHONE HYDROCHLORIDE 1.5 MILLIGRAM(S): 2 INJECTION INTRAMUSCULAR; INTRAVENOUS; SUBCUTANEOUS at 05:26

## 2018-09-21 RX ADMIN — Medication 975 MILLIGRAM(S): at 17:45

## 2018-09-21 RX ADMIN — Medication 12.5 MILLIGRAM(S): at 01:25

## 2018-09-21 RX ADMIN — HYDROMORPHONE HYDROCHLORIDE 1.5 MILLIGRAM(S): 2 INJECTION INTRAMUSCULAR; INTRAVENOUS; SUBCUTANEOUS at 05:56

## 2018-09-21 RX ADMIN — Medication 12.5 MILLIGRAM(S): at 09:28

## 2018-09-21 RX ADMIN — FENTANYL CITRATE 1 PATCH: 50 INJECTION INTRAVENOUS at 11:19

## 2018-09-21 RX ADMIN — Medication 40 MILLIGRAM(S): at 11:18

## 2018-09-21 RX ADMIN — Medication 30 MILLIGRAM(S): at 19:19

## 2018-09-21 RX ADMIN — HYDROMORPHONE HYDROCHLORIDE 1.5 MILLIGRAM(S): 2 INJECTION INTRAMUSCULAR; INTRAVENOUS; SUBCUTANEOUS at 22:55

## 2018-09-21 NOTE — DISCHARGE NOTE ADULT - CARE PLAN
Goal:	to home Principal Discharge DX:	Abdominal pain, unspecified abdominal location  Goal:	to home  Secondary Diagnosis:	Bladder spasm Principal Discharge DX:	Abdominal pain, unspecified abdominal location  Goal:	to home  Assessment and plan of treatment:	You were found to have inflammation of your appendix and had it removed. You had pain after surgery which was treated with IV medication. You were seen by your pain doctor for chronic pain and will follow up with him outpatient.  Secondary Diagnosis:	Bladder spasm  Assessment and plan of treatment:	You had urinary retention after surgery. You had a hardwick placed as you were not able to pee without it. You should follow up with urology outpatient.  Secondary Diagnosis:	Anxiety  Assessment and plan of treatment:	Continue your chronic anxiety medications  Secondary Diagnosis:	Constipation  Assessment and plan of treatment:	Continue your home medications. New medications have also been sent to your pharmacy to be used as needed for constipation. Principal Discharge DX:	Abdominal pain, unspecified abdominal location  Goal:	to home  Assessment and plan of treatment:	You were found to have inflammation of your appendix and had it removed. You had pain after surgery which was treated with IV medication. You were seen by your pain doctor for chronic pain and will follow up with him outpatient.  Secondary Diagnosis:	Bladder spasm  Assessment and plan of treatment:	You had urinary retention after surgery. It has now resolved. You should follow up with urology outpatient.  Secondary Diagnosis:	Anxiety  Assessment and plan of treatment:	Continue your chronic anxiety medications  Secondary Diagnosis:	Constipation  Assessment and plan of treatment:	Continue your home medications. New medications have also been sent to your pharmacy to be used as needed for constipation.

## 2018-09-21 NOTE — PROGRESS NOTE ADULT - SUBJECTIVE AND OBJECTIVE BOX
Transfer Note: from [facility] (Gynecology) to [facility] (Medicine)    Patient is a 22y old  Female who presents with a chief complaint of abdominal pain (21 Sep 2018 09:03)      INTERVAL HPI/OVERNIGHT EVENTS:      REVIEW OF SYSTEMS: negative except as above     T(C): 37 (09-21-18 @ 15:35), Max: 37.1 (09-21-18 @ 05:38)  HR: 87 (09-21-18 @ 15:35) (73 - 104)  BP: 145/89 (09-21-18 @ 15:35) (120/78 - 157/87)  RR: 18 (09-21-18 @ 15:35) (16 - 18)  SpO2: 97% (09-21-18 @ 15:35) (95% - 98%)  Wt(kg): --Vital Signs Last 24 Hrs  T(C): 37 (21 Sep 2018 15:35), Max: 37.1 (21 Sep 2018 05:38)  T(F): 98.6 (21 Sep 2018 15:35), Max: 98.8 (21 Sep 2018 14:05)  HR: 87 (21 Sep 2018 15:35) (73 - 104)  BP: 145/89 (21 Sep 2018 15:35) (120/78 - 157/87)  BP(mean): --  RR: 18 (21 Sep 2018 15:35) (16 - 18)  SpO2: 97% (21 Sep 2018 15:35) (95% - 98%)    PHYSICAL EXAM:    Consultant(s) Notes Reviewed:  [x ] YES  [ ] NO  Care Discussed with Consultants/Other Providers [ x] YES  [ ] NO    LABS:      RADIOLOGY & ADDITIONAL TESTS:      acetaminophen   Tablet .. 975 milliGRAM(s) Oral every 8 hours  busPIRone 20 milliGRAM(s) Oral <User Schedule>  diazepam    Tablet 5 milliGRAM(s) Oral every 8 hours  diphenhydrAMINE   Injectable 12.5 milliGRAM(s) IV Push every 4 hours PRN  docusate sodium 100 milliGRAM(s) Oral three times a day  fentaNYL   Patch  25 MICROgram(s)/Hr. 1 Patch Transdermal every 48 hours  FLUoxetine 40 milliGRAM(s) Oral daily  HYDROmorphone  Injectable 1.5 milliGRAM(s) IV Push every 4 hours PRN  ketorolac   Injectable 30 milliGRAM(s) IV Push every 6 hours  lactated ringers. 1000 milliLiter(s) IV Continuous <Continuous>  ondansetron Injectable 4 milliGRAM(s) IV Push every 6 hours PRN  simethicone 80 milliGRAM(s) Chew every 6 hours Transfer Note: from [facility] (Gynecology) to [facility] (Medicine)    Hospital Course:  22 yoF w/ pmh of chronic abdominal pain, endometriosis s/p dx lap in  and 2016, ovarian cysts, anxiety, urinary retention, and asthma presenting with RUQ pain, nausea vomiting for the past 3 days. Pt states that her chronic pain in RLQ but 3 days she started experiencing RUQ pain that is sharp in nature and radiates to chest. Poor PO intake since onset and pt has NBNB vomiting everytime she eats. No diarrhea. No fevers but felt cold at home. GI hx significant for colonoscopies and endoscopies in the past showing  gastritis and colitis of unknown origin. In Er, VSS. No leukocytosis. Ct abd showing possible appendicitis, patient seen by Surgery and GYN. Taken to GYN service where performed appendectomy and removed mild endometriosis lesions. Pt reported urinary retention after surgery and failed 2 TOVs. Pt passing flatus but no BMs, had one episode of clotted blood pass per rectum, likely from rectal tube use during surgery. Pt was transferred to medicine under private attending Dr. Wilson for pain control and urinary retention. Private urologist will see pt Monday.      Vitals:  T(C): 37 (18 @ 15:35), Max: 37.1 (18 @ 05:38)  HR: 87 (18 @ 15:35) (73 - 104)  BP: 145/89 (18 @ 15:35) (120/78 - 157/87)  RR: 18 (18 @ 15:35) (16 - 18)  SpO2: 97% (18 @ 15:35) (95% - 98%)      PHYSICAL EXAM:        Constitutional:          LABS:   	           	           14.0   	4.6   )-----------( 218      ( 16 Sep 2018 16:34 )  	           39.8     	-    	140  |  98  |  6<L>  	----------------------------<  104<H>  	4.0   |  25  |  0.95    	Ca    9.8      16 Sep 2018 16:34    	TPro  7.5  /  Alb  4.8  /  TBili  0.5  /  DBili  x   /  AST  23  /  ALT  17  /  AlkPhos  68  -    	PT/INR - ( 16 Sep 2018 16:34 )   PT: 12.5 sec;   INR: 1.12       	   	PTT - ( 16 Sep 2018 16:34 )  PTT:31.1 sec  	Urinalysis Basic - ( 16 Sep 2018 16:51 )    	Color: Yellow / Appearance: Clear / S.015 / pH: x  	Gluc: x / Ketone: 40 mg/dL  / Bili: Negative / Urobili: 0.2 E.U./dL   	Blood: x / Protein: NEGATIVE mg/dL / Nitrite: NEGATIVE   	Leuk Esterase: NEGATIVE / RBC: x / WBC x   	Sq Epi: x / Non Sq Epi: x / Bacteria: x        RADIOLOGY, EKG & ADDITIONAL TESTS: Reviewed.       MEDICATIONS  (STANDING):  acetaminophen   Tablet .. 975 milliGRAM(s) Oral every 8 hours  busPIRone 20 milliGRAM(s) Oral <User Schedule>  diazepam    Tablet 5 milliGRAM(s) Oral every 8 hours  docusate sodium 100 milliGRAM(s) Oral three times a day  fentaNYL   Patch  25 MICROgram(s)/Hr. 1 Patch Transdermal every 48 hours  FLUoxetine 40 milliGRAM(s) Oral daily  ketorolac   Injectable 30 milliGRAM(s) IV Push every 6 hours  lactated ringers. 1000 milliLiter(s) (75 mL/Hr) IV Continuous <Continuous>  simethicone 80 milliGRAM(s) Chew every 6 hours    MEDICATIONS  (PRN):  diphenhydrAMINE   Injectable 12.5 milliGRAM(s) IV Push every 4 hours PRN Rash and/or Itching  HYDROmorphone  Injectable 1.5 milliGRAM(s) IV Push every 4 hours PRN Breakthrough pain  ondansetron Injectable 4 milliGRAM(s) IV Push every 6 hours PRN Postoperative Nausea and/or Vomiting if reglan is inadequate Transfer Note: from [facility] (Gynecology) to [facility] (Medicine)    Hospital Course:  22 yoF w/ pmh of chronic abdominal pain, endometriosis s/p dx lap in 2015 and 2016, ovarian cysts, anxiety, urinary retention, and asthma presenting with RUQ pain, nausea vomiting for the past 3 days. Pt states that her chronic pain in RLQ but 3 days she started experiencing RUQ pain that is sharp in nature and radiates to chest. Poor PO intake since onset and pt has NBNB vomiting everytime she eats. No diarrhea. No fevers but felt cold at home. GI hx significant for colonoscopies and endoscopies in the past showing  gastritis and colitis of unknown origin. In Er, VSS. No leukocytosis. Ct abd showing possible appendicitis, patient seen by Surgery and GYN. Taken to GYN service where performed appendectomy and removed mild endometriosis lesions. had normal cystoscopy and b/l ureteral stents placed during procedure. Pt reported urinary retention after surgery and failed 2 TOVs. Urology consulted. Pt passing flatus but no BMs, had one episode of clotted blood pass per rectum, likely from rectal tube use during surgery. Pt was transferred to medicine under private attending Dr. Wilson for pain control and urinary retention.       Vitals:  T(C): 37 (09-21-18 @ 15:35), Max: 37.1 (09-21-18 @ 05:38)  HR: 87 (09-21-18 @ 15:35) (73 - 104)  BP: 145/89 (09-21-18 @ 15:35) (120/78 - 157/87)  RR: 18 (09-21-18 @ 15:35) (16 - 18)  SpO2: 97% (09-21-18 @ 15:35) (95% - 98%)      PHYSICAL EXAM:  general: laying comfortably in bed  Heart: RRR  lungs: CTAB  abdomen: soft, mildly distended, diffusely tender jazz in lower quadrants  legs: no edema  skin: no rashes  neuro: aa0x3    labs: reviewed and wnl    RADIOLOGY, EKG & ADDITIONAL TESTS: Reviewed.       MEDICATIONS  (STANDING):  acetaminophen   Tablet .. 975 milliGRAM(s) Oral every 8 hours  busPIRone 20 milliGRAM(s) Oral <User Schedule>  diazepam    Tablet 5 milliGRAM(s) Oral every 8 hours  docusate sodium 100 milliGRAM(s) Oral three times a day  fentaNYL   Patch  25 MICROgram(s)/Hr. 1 Patch Transdermal every 48 hours  FLUoxetine 40 milliGRAM(s) Oral daily  ketorolac   Injectable 30 milliGRAM(s) IV Push every 6 hours  lactated ringers. 1000 milliLiter(s) (75 mL/Hr) IV Continuous <Continuous>  simethicone 80 milliGRAM(s) Chew every 6 hours    MEDICATIONS  (PRN):  diphenhydrAMINE   Injectable 12.5 milliGRAM(s) IV Push every 4 hours PRN Rash and/or Itching  HYDROmorphone  Injectable 1.5 milliGRAM(s) IV Push every 4 hours PRN Breakthrough pain  ondansetron Injectable 4 milliGRAM(s) IV Push every 6 hours PRN Postoperative Nausea and/or Vomiting if reglan is inadequate

## 2018-09-21 NOTE — PROGRESS NOTE ADULT - SUBJECTIVE AND OBJECTIVE BOX
Pt evaluated at bedside.  She reports pain is well controlled. she is tolerating regular diet though pt notes she has reduced appetite. She is passing flatus and ambulating.     T(C): 37.1 (09-21-18 @ 05:38), Max: 37.1 (09-21-18 @ 05:38)  HR: 73 (09-21-18 @ 05:38) (73 - 100)  BP: 120/78 (09-21-18 @ 05:38) (120/78 - 127/74)  RR: 18 (09-21-18 @ 05:38) (17 - 18)  SpO2: 98% (09-21-18 @ 05:38) (98% - 98%)  GA: NAD  CV: RRR, no MRG  Pulm: CTAB  Abd: +BS, soft, NTND, no rebound or guarding, incisions c/d/i  Extrem: SCDs in place    09-20 @ 07:01  -  09-21 @ 07:00  --------------------------------------------------------  IN: 2565 mL / OUT: 2500 mL / NET: 65 mL

## 2018-09-21 NOTE — DISCHARGE NOTE ADULT - HOSPITAL COURSE
22 yoF w/ pmh of chronic abdominal pain, endometriosis s/p dx lap in 2015 and 2016, ovarian cysts, anxiety, urinary retention, and asthma presenting with RUQ pain, nausea vomiting for the past 3 days. Pt states that her chronic pain in RLQ but 3 days she started experiencing RUQ pain that is sharp in nature and radiates to chest. Poor PO intake since onset and pt has NBNB vomiting everytime she eats. No diarrhea. No fevers but felt cold at home. GI hx significant for colonoscopies and endoscopies in the past showing  gastritis and colitis of unknown origin. In Er, VSS. No leukocytosis. Ct abd showing possible appendicitis, patient seen by Surgery and GYN. Taken to GYN service where performed appendectomy and removed mild endometriosis lesions. had normal cystoscopy and b/l ureteral stents placed during procedure. Pt reported urinary retention after surgery and failed 2 TOVs. Urology consulted. Pt passing flatus but no BMs, had one episode of clotted blood pass per rectum, likely from rectal tube use during surgery. Pt was transferred to medicine under private attending Dr. Wilson for pain control and urinary retention. 22 yoF w/ pmh of chronic abdominal pain, endometriosis s/p dx lap in 2015 and 2016, ovarian cysts, anxiety, urinary retention, and asthma presenting with RUQ pain, nausea vomiting for the past 3 days. Pt states that her chronic pain in RLQ but 3 days she started experiencing RUQ pain that is sharp in nature and radiates to chest. Poor PO intake since onset and pt has NBNB vomiting everytime she eats. No diarrhea. No fevers but felt cold at home. GI hx significant for colonoscopies and endoscopies in the past showing  gastritis and colitis of unknown origin. In Er, VSS. No leukocytosis. Ct abd showing possible appendicitis, patient seen by Surgery and GYN. Taken to GYN service where performed appendectomy and removed mild endometriosis lesions. had normal cystoscopy and b/l ureteral stents placed during procedure. Pt reported urinary retention after surgery and failed 2 TOVs. Urology consulted. Pt passing flatus but no BMs, had one episode of clotted blood pass per rectum, likely from rectal tube use during surgery. Pt was transferred to medicine under private attending Dr. Wilson for pain control and urinary retention. Pt continued to have urinary retention, failing 3 TOVs, and Zurita was reinserted. Pt dcd to home with follow up with urology, GYN, pain management and primary care. Dr. Wilson wanted to pt to go to drug rehab but Dr. Ramírez felt would be more appropriate when pt was more stable. 22 yoF w/ pmh of chronic abdominal pain, endometriosis s/p dx lap in 2015 and 2016, ovarian cysts, anxiety, urinary retention, and asthma presenting with RUQ pain, nausea vomiting for the past 3 days. Pt states that her chronic pain in RLQ but 3 days she started experiencing RUQ pain that is sharp in nature and radiates to chest. Poor PO intake since onset and pt has NBNB vomiting everytime she eats. No diarrhea. No fevers but felt cold at home. GI hx significant for colonoscopies and endoscopies in the past showing  gastritis and colitis of unknown origin. In Er, VSS. No leukocytosis. Ct abd showing possible appendicitis, patient seen by Surgery and GYN. Taken to GYN service where performed appendectomy and removed mild endometriosis lesions. had normal cystoscopy and b/l ureteral stents placed during procedure. Pt reported urinary retention after surgery and failed 2 TOVs. Urology consulted. Pt passing flatus but no BMs, had one episode of clotted blood pass per rectum, likely from rectal tube use during surgery. Pt was transferred to medicine under private attending Dr. Wilson for pain control and urinary retention. Pt continued to have urinary retention, failing 3 TOVs, and Zurita was going to be reinserted, when pt was finally able to void 200, then 400mL. Pt dcd to home with follow up with urology, GYN, pain management and primary care. Dr. Wilson wanted to pt to go to drug rehab but Dr. Ramírez felt would be more appropriate when pt was more stable. 22 yoF w/ pmh of chronic abdominal pain, endometriosis s/p dx lap in 2015 and 2016, ovarian cysts, anxiety, urinary retention, and asthma presenting with RUQ pain, nausea vomiting for the past 3 days. Pt states that her chronic pain in RLQ but 3 days she started experiencing RUQ pain that is sharp in nature and radiates to chest. Poor PO intake since onset and pt has NBNB vomiting everytime she eats. No diarrhea. No fevers but felt cold at home. GI hx significant for colonoscopies and endoscopies in the past showing  gastritis and colitis of unknown origin. In Er, VSS. No leukocytosis. Ct abd showing possible appendicitis, patient seen by Surgery and GYN. Taken to GYN service where performed appendectomy and removed mild endometriosis lesions. had normal cystoscopy and b/l ureteral stents placed during procedure. Pt reported urinary retention after surgery and failed 2 TOVs. Urology consulted. Pt passing flatus but no BMs, had one episode of clotted blood pass per rectum, likely from rectal tube use during surgery. Pt was transferred to medicine under private attending Dr. Wilson for pain control and urinary retention. Pt continued to have urinary retention, failing 3 TOVs, and Hardwick was going to be reinserted, when pt was finally able to void 200, then 400mL. PVR 215mL. Pt did not want hardwick to placed. Pt dcd to home with follow up with urology, GYN, pain management and primary care. Dr. Wilson wanted to pt to go to drug rehab but Dr. Ramírez felt would be more appropriate when pt was more stable. 22 yoF w/ pmh of chronic abdominal pain, endometriosis s/p dx lap in 2015 and 2016, ovarian cysts, anxiety, urinary retention, and asthma presenting with RUQ pain, nausea vomiting for the past 3 days. Pt states that her chronic pain in RLQ but 3 days she started experiencing RUQ pain that is sharp in nature and radiates to chest. Poor PO intake since onset and pt has NBNB vomiting everytime she eats. No diarrhea. No fevers but felt cold at home. GI hx significant for colonoscopies and endoscopies in the past showing  gastritis and colitis of unknown origin. In Er, VSS. No leukocytosis. Ct abd showing possible appendicitis, patient seen by Surgery and GYN. Taken to GYN service where performed appendectomy and removed mild endometriosis lesions. had normal cystoscopy and b/l ureteral stents placed during procedure. Pt reported urinary retention after surgery and failed 2 TOVs. Urology consulted, CT showed hydroureter in R, but repeat CT it had resolved. Zurita inserted. On removal pt failed 3 TOVS w/ PVR>400, but then finally voided significant urine on 4th attempt. Pt dcd to home with follow up with urology, GYN, pain management and primary care. Dr. Wilson wanted to pt to go to drug rehab but Dr. Ramírez felt would be more appropriate when pt was more stable so pt will follow with Dr. Ramírez and go to drug rehab in the future.

## 2018-09-21 NOTE — DISCHARGE NOTE ADULT - MEDICATION SUMMARY - MEDICATIONS TO TAKE
I will START or STAY ON the medications listed below when I get home from the hospital:    fentaNYL 12 mcg/hr transdermal film, extended release  -- 1 film(s) by transdermal patch every 72 hours  -- Indication: For pain    oxyCODONE 15 mg oral tablet  -- 1 tab(s) by mouth 5 times a day, As Needed  -- Indication: For pain    Valium  -- 20 milligram(s) by mouth once a day  -- Indication: For Bladder spasm    FLUoxetine 40 mg oral capsule  -- 1 cap(s) by mouth once a day  -- Indication: For Anxiety    busPIRone  -- 20 milligram(s) by mouth once a day  -- Indication: For depression    Xanax 1 mg oral tablet  -- 1 tab(s) by mouth once a day  -- Indication: For Anxiety    Bentyl 10 mg oral capsule  -- 1 tab(s) by mouth 3 times a day  -- Indication: For Constipation    Linzess 145 mcg oral capsule  -- 1 cap(s) by mouth once a day  -- Indication: For Consitpation    Colace 100 mg oral capsule  -- 1 cap(s) by mouth 2 times a day  -- Indication: For Constipation    MiraLax oral powder for reconstitution  -- 17 gram(s) by mouth once a day  -- Indication: For Constipation    senna oral tablet  -- 1 tab(s) by mouth once a day as needed for constipation  -- Indication: For Constipation    simethicone 80 mg oral tablet, chewable  -- 1 tab(s) by mouth every 6 hours as needed for gas  -- Indication: For gas    tiZANidine  -- 4 milligram(s) by mouth once a day (at bedtime)  -- Indication: For urinary retention    norethindrone 5 mg oral tablet  -- 1 tab(s) by mouth once a day  -- Indication: For Nutrition, metabolism, and development symptoms

## 2018-09-21 NOTE — CONSULT NOTE ADULT - CONSULT REQUESTED DATE/TIME
17-Sep-2018 00:37
17-Sep-2018 02:25
17-Sep-2018 09:20
17-Sep-2018 18:47
21-Sep-2018 19:12
17-Sep-2018 09:33

## 2018-09-21 NOTE — DISCHARGE NOTE ADULT - CARE PROVIDER_API CALL
Oscar Wilson), Internal Medicine  [address] [address]  Phone: [phone]  Fax: [phone] Oscar Wilson), Internal Medicine  [address] [address]  Phone: [phone]  Fax: [phone]    Bibi Willis), Obstetrics and Gynecology  [address] [place] [address]  Phone: [phone]  Fax: [phone]    Rainer Wiggins), Urology  [address] [address]  Phone: [phone]  Fax: [phone]

## 2018-09-21 NOTE — DISCHARGE NOTE ADULT - PLAN OF CARE
to home You were found to have inflammation of your appendix and had it removed. You had pain after surgery which was treated with IV medication. You were seen by your pain doctor for chronic pain and will follow up with him outpatient. You had urinary retention after surgery. You had a hardwick placed as you were not able to pee without it. You should follow up with urology outpatient. Continue your chronic anxiety medications Continue your home medications. New medications have also been sent to your pharmacy to be used as needed for constipation. You had urinary retention after surgery. It has now resolved. You should follow up with urology outpatient.

## 2018-09-21 NOTE — DISCHARGE NOTE ADULT - PATIENT PORTAL LINK FT
You can access the [facility] Patient Portal, offered by [facility], by registering with the following website: [email or web address]

## 2018-09-21 NOTE — PROGRESS NOTE ADULT - PROBLEM SELECTOR PLAN 2
chronic urinary retention, but worse s/p surgery. nl cystoscopy, s/p b/l ureteral stents placed during sx this admission. failed 2 TOVs.  - c/w valium for bladder spasm  -urology consulted for urinary retention, follow uro recs  -PVR

## 2018-09-21 NOTE — CONSULT NOTE ADULT - ASSESSMENT
22F POD3, urology consulted for urinary retention    -16Fr hardwick catheter placed  -Start Flomax  -Do not remove hardwick. Trial of void per urology.  -Followup with Dr. Xie in the outpatient [facility]

## 2018-09-21 NOTE — PROGRESS NOTE ADULT - ASSESSMENT
21yo POD3/HD5 s/p diagnostic laparoscopy, endometriosis excision, appendectomy and cystoscopy. Pt is hemodynamically stable.     Neuro: Toradol and Tylenol standing, Dilaudid 1.5mg PRN. Fentanyl patch 25mcg every 48hrs   Anxiety: continue Prozac, Buspar and Diazepam TID   Cardio: No issues   Pulm: encourage ISS  GI: Reg diet, Zofran prn. Simethicone and Colace    : Hardwick#3-TOV this AM, home with hardwick if fails   Activity: ambulate as tolerated   DVT ppx: SCDs, ambulation   Follow up pain management, medicine recommendations   Social work- follow up drug rehab placement outpatient

## 2018-09-21 NOTE — PROGRESS NOTE ADULT - SUBJECTIVE AND OBJECTIVE BOX
Neurology Follow up note    Name  ANU SAN    HPI:  22 yoF w/ pmh of chronic abdominal pain, endometriosis s/p dx lap in 2015 and 2016, ovarian cysts, anxiety, and asthma presenting with RUQ pain, nausea vomiting for the past 3 days. Pt states that her chronic pain in RLQ but 3 days she started experiencing RUQ pain that is sharp in nature and radiates to chest. Poor PO intake since onset and pt has NBNB vomiting everytime she eats. No diarrhea. No fevers but felt cold at home.   ROS negative for headache, dizziness, SOB. Patient with chronic hx of urinary retention, recieved injections? for muscle spasticity.   GI hx significant for colonoscopies and endoscopies in the past showing  gastritis and colitis of unknown origin.   In Er, VSS. No leukocytosis. Ct abd showing possible appendicitis, patient seen by Surgery- per recs unlikely appedicitis can folowup opt. No gallstones on RUQ US. Seen by GYn, pain unlikely GNY in etiology. Recommended opt f/u (17 Sep 2018 03:13)      Interval History - back pain and discomfort in the LE- no new weakness         REVIEW OF SYSTEMS    Vital Signs Last 24 Hrs  T(C): 36.5 (21 Sep 2018 08:42), Max: 37.3 (20 Sep 2018 13:11)  T(F): 97.7 (21 Sep 2018 08:42), Max: 99.2 (20 Sep 2018 13:11)  HR: 90 (21 Sep 2018 08:42) (73 - 104)  BP: 124/68 (21 Sep 2018 08:42) (120/78 - 157/87)  BP(mean): --  RR: 16 (21 Sep 2018 08:42) (16 - 18)  SpO2: 96% (21 Sep 2018 08:42) (95% - 98%)    Physical Exam-     Mental Status- awake and alert    Cranial Nerves- full EOM    Gait and station- n/a    Motor- no foot drop    Reflexes- intact    Sensation- nl    Coordination- no tremors    Vascular - no bruits    Medications  acetaminophen   Tablet .. 975 milliGRAM(s) Oral every 8 hours  busPIRone 20 milliGRAM(s) Oral <User Schedule>  diazepam    Tablet 5 milliGRAM(s) Oral every 8 hours  diphenhydrAMINE   Injectable 12.5 milliGRAM(s) IV Push every 4 hours PRN  docusate sodium 100 milliGRAM(s) Oral three times a day  fentaNYL   Patch  25 MICROgram(s)/Hr. 1 Patch Transdermal every 48 hours  FLUoxetine 40 milliGRAM(s) Oral daily  HYDROmorphone  Injectable 1.5 milliGRAM(s) IV Push every 4 hours PRN  ketorolac   Injectable 30 milliGRAM(s) IV Push every 6 hours  lactated ringers. 1000 milliLiter(s) IV Continuous <Continuous>  ondansetron Injectable 4 milliGRAM(s) IV Push every 6 hours PRN  simethicone 80 milliGRAM(s) Chew every 6 hours      Lab      Radiology    Assessment- Thoracic pain   R/O neurogenic bladder    Plan

## 2018-09-21 NOTE — CONSULT NOTE ADULT - SUBJECTIVE AND OBJECTIVE BOX
22F s/p POD 3 diag lap/cystoscopy/bilateral stent placement and removal and 2 failed trials of void. Urology consulted for urinary retention. Complaining of nausea/abdominal pain/episode of blood per rectum. Denies f/c/v.     16Fr catheter placed under sterile technique with 700cc clear urine return    Gen: Uncomfortable appearing  Abd: s, suprapubic tenderness, distended, PVR 400cc

## 2018-09-21 NOTE — PROGRESS NOTE ADULT - ASSESSMENT
23 yo F w/ pmh of chronic abdominal pain, endometriosis s/p dx lap in 2015 and 2016, ovarian cysts, anxiety, and asthma presenting with RUQ pain, nausea vomiting for the past 3 days s/p appendectomy and endometriosis removal. Transferred to [facility] for pain control and urinary retention.

## 2018-09-21 NOTE — DISCHARGE NOTE ADULT - ADDITIONAL INSTRUCTIONS
Follow up with your primary doctor Dr. Wilson in 1 week.  Follow up with Dr. Ramírez for pain management.  F/u w/ Dr. Wiggins in urology within 2 weeks to discuss urinary retention and Zurita.  You pain medications have already been sent to the pharmacy.  F/u w/ Dr. Willis in GYN for post-op in 2 weeks.

## 2018-09-21 NOTE — PROGRESS NOTE ADULT - PROBLEM SELECTOR PLAN 1
s/p laparsospy for  endometriosis and appendectomy  -c/w Toradol  (until sunday) and Tylenol standing, Dilaudid 1.5mg PRN. Fentanyl patch 25mcg every 48hrs for pain control  -pain mgmt following, follow recs    #nausea  -zofran PRN s/p laparsospy for  endometriosis and appendectomy  -c/w Toradol  (until sunday) and Tylenol standing, Dilaudid 1.5mg PRN. Fentanyl patch 25mcg every 48hrs for pain control  -pain mgmt following, follow recs    #nausea  -zofran PRN    #BRBPR  -pt endorses 1 episode of a small amount of BRBPR (s/p appendectomy and rectal tube placement) when passing gas. hb is 12 and stable  -monitor cbc  -active T+S  -monitor VS, s/s of GI bleed  -consider gi consult if persists or if VS decline s/p laparsospy for  endometriosis and appendectomy  -c/w Toradol  (until sunday) and Tylenol standing, Dilaudid 1.5mg PRN. Fentanyl patch 25mcg every 48hrs for pain control  -pain mgmt following, follow recs    #nausea  -zofran PRN    #BRBPR  -pt endorses 1 episode of a small amount of BRBPR (s/p appendectomy and rectal tube placement) when passing gas. hb is 12 and stable. rectal exam revealed no hemorrhoids, anal tears, and no gross blood or melena.  -monitor cbc  -active T+S  -monitor VS, s/s of GI bleed  -if recurs/ persists or if VS decline--> consider gi consult, start ppi, stop ketorolac

## 2018-09-22 LAB
ANION GAP SERPL CALC-SCNC: 6 MMOL/L — SIGNIFICANT CHANGE UP (ref 5–17)
APTT BLD: 30.1 SEC — SIGNIFICANT CHANGE UP (ref 27.5–37.4)
BUN SERPL-MCNC: 5 MG/DL — LOW (ref 7–23)
CALCIUM SERPL-MCNC: 8.8 MG/DL — SIGNIFICANT CHANGE UP (ref 8.4–10.5)
CHLORIDE SERPL-SCNC: 106 MMOL/L — SIGNIFICANT CHANGE UP (ref 96–108)
CO2 SERPL-SCNC: 29 MMOL/L — SIGNIFICANT CHANGE UP (ref 22–31)
CREAT SERPL-MCNC: 0.97 MG/DL — SIGNIFICANT CHANGE UP (ref 0.5–1.3)
GLUCOSE SERPL-MCNC: 98 MG/DL — SIGNIFICANT CHANGE UP (ref 70–99)
HCT VFR BLD CALC: 30.2 % — LOW (ref 34.5–45)
HGB BLD-MCNC: 10.3 G/DL — LOW (ref 11.5–15.5)
INR BLD: 1.08 — SIGNIFICANT CHANGE UP (ref 0.88–1.16)
MAGNESIUM SERPL-MCNC: 1.7 MG/DL — SIGNIFICANT CHANGE UP (ref 1.6–2.6)
MCHC RBC-ENTMCNC: 30.1 PG — SIGNIFICANT CHANGE UP (ref 27–34)
MCHC RBC-ENTMCNC: 34.1 G/DL — SIGNIFICANT CHANGE UP (ref 32–36)
MCV RBC AUTO: 88.3 FL — SIGNIFICANT CHANGE UP (ref 80–100)
PLATELET # BLD AUTO: 180 K/UL — SIGNIFICANT CHANGE UP (ref 150–400)
POTASSIUM SERPL-MCNC: 3.3 MMOL/L — LOW (ref 3.5–5.3)
POTASSIUM SERPL-SCNC: 3.3 MMOL/L — LOW (ref 3.5–5.3)
PROTHROM AB SERPL-ACNC: 12 SEC — SIGNIFICANT CHANGE UP (ref 9.8–12.7)
RBC # BLD: 3.42 M/UL — LOW (ref 3.8–5.2)
RBC # FLD: 12.2 % — SIGNIFICANT CHANGE UP (ref 10.3–16.9)
SODIUM SERPL-SCNC: 141 MMOL/L — SIGNIFICANT CHANGE UP (ref 135–145)
WBC # BLD: 7 K/UL — SIGNIFICANT CHANGE UP (ref 3.8–10.5)
WBC # FLD AUTO: 7 K/UL — SIGNIFICANT CHANGE UP (ref 3.8–10.5)

## 2018-09-22 PROCEDURE — 74178 CT ABD&PLV WO CNTR FLWD CNTR: CPT | Mod: 26

## 2018-09-22 RX ADMIN — ONDANSETRON 4 MILLIGRAM(S): 8 TABLET, FILM COATED ORAL at 16:40

## 2018-09-22 RX ADMIN — HYDROMORPHONE HYDROCHLORIDE 1.5 MILLIGRAM(S): 2 INJECTION INTRAMUSCULAR; INTRAVENOUS; SUBCUTANEOUS at 10:32

## 2018-09-22 RX ADMIN — Medication 975 MILLIGRAM(S): at 03:43

## 2018-09-22 RX ADMIN — HYDROMORPHONE HYDROCHLORIDE 1.5 MILLIGRAM(S): 2 INJECTION INTRAMUSCULAR; INTRAVENOUS; SUBCUTANEOUS at 16:40

## 2018-09-22 RX ADMIN — SIMETHICONE 80 MILLIGRAM(S): 80 TABLET, CHEWABLE ORAL at 23:43

## 2018-09-22 RX ADMIN — Medication 975 MILLIGRAM(S): at 23:43

## 2018-09-22 RX ADMIN — Medication 975 MILLIGRAM(S): at 07:55

## 2018-09-22 RX ADMIN — Medication 12.5 MILLIGRAM(S): at 05:19

## 2018-09-22 RX ADMIN — SODIUM CHLORIDE 75 MILLILITER(S): 9 INJECTION, SOLUTION INTRAVENOUS at 05:28

## 2018-09-22 RX ADMIN — HYDROMORPHONE HYDROCHLORIDE 1.5 MILLIGRAM(S): 2 INJECTION INTRAMUSCULAR; INTRAVENOUS; SUBCUTANEOUS at 09:28

## 2018-09-22 RX ADMIN — Medication 5 MILLIGRAM(S): at 21:40

## 2018-09-22 RX ADMIN — SENNA PLUS 1 TABLET(S): 8.6 TABLET ORAL at 11:29

## 2018-09-22 RX ADMIN — Medication 100 MILLIGRAM(S): at 21:40

## 2018-09-22 RX ADMIN — Medication 5 MILLIGRAM(S): at 05:21

## 2018-09-22 RX ADMIN — Medication 40 MILLIGRAM(S): at 11:30

## 2018-09-22 RX ADMIN — Medication 5 MILLIGRAM(S): at 16:40

## 2018-09-22 RX ADMIN — Medication 100 MILLIGRAM(S): at 16:40

## 2018-09-22 RX ADMIN — Medication 12.5 MILLIGRAM(S): at 09:28

## 2018-09-22 RX ADMIN — Medication 20 MILLIGRAM(S): at 18:03

## 2018-09-22 RX ADMIN — Medication 100 MILLIGRAM(S): at 05:21

## 2018-09-22 RX ADMIN — SIMETHICONE 80 MILLIGRAM(S): 80 TABLET, CHEWABLE ORAL at 05:19

## 2018-09-22 RX ADMIN — Medication 975 MILLIGRAM(S): at 07:18

## 2018-09-22 RX ADMIN — HYDROMORPHONE HYDROCHLORIDE 1.5 MILLIGRAM(S): 2 INJECTION INTRAMUSCULAR; INTRAVENOUS; SUBCUTANEOUS at 05:20

## 2018-09-22 RX ADMIN — Medication 12.5 MILLIGRAM(S): at 19:50

## 2018-09-22 RX ADMIN — HYDROMORPHONE HYDROCHLORIDE 1.5 MILLIGRAM(S): 2 INJECTION INTRAMUSCULAR; INTRAVENOUS; SUBCUTANEOUS at 15:39

## 2018-09-22 RX ADMIN — Medication 975 MILLIGRAM(S): at 16:40

## 2018-09-22 RX ADMIN — HYDROMORPHONE HYDROCHLORIDE 1.5 MILLIGRAM(S): 2 INJECTION INTRAMUSCULAR; INTRAVENOUS; SUBCUTANEOUS at 05:27

## 2018-09-22 RX ADMIN — POLYETHYLENE GLYCOL 3350 17 GRAM(S): 17 POWDER, FOR SOLUTION ORAL at 11:29

## 2018-09-22 RX ADMIN — SIMETHICONE 80 MILLIGRAM(S): 80 TABLET, CHEWABLE ORAL at 11:29

## 2018-09-22 RX ADMIN — HYDROMORPHONE HYDROCHLORIDE 1.5 MILLIGRAM(S): 2 INJECTION INTRAMUSCULAR; INTRAVENOUS; SUBCUTANEOUS at 19:50

## 2018-09-22 RX ADMIN — Medication 12.5 MILLIGRAM(S): at 15:50

## 2018-09-22 RX ADMIN — SIMETHICONE 80 MILLIGRAM(S): 80 TABLET, CHEWABLE ORAL at 18:03

## 2018-09-22 NOTE — DIETITIAN INITIAL EVALUATION ADULT. - ENERGY NEEDS
Ideal body weight used for calculations as pt >120% of IBW.   ABW 72.6kg, IBW 56kg, 128% IBW, ht 65", BMI 26.6   Nutrient needs based on [facility] standards of care for repletion in adults 2/2 persistent n/v PTA, dec PO

## 2018-09-22 NOTE — PROGRESS NOTE ADULT - SUBJECTIVE AND OBJECTIVE BOX
Subjective: Pt seen and examined at bedside. Pt is laying in bed. Still retaining urine with mild abdominal discomfort.  Denies any CP, SOB, N/V, F, Chills, or diarrhea at this time. No acute events overnight.     .  VITAL SIGNS:  T(C): 36.9 (09-22-18 @ 08:37), Max: 37.1 (09-21-18 @ 20:44)  T(F): 98.4 (09-22-18 @ 08:37), Max: 98.7 (09-21-18 @ 20:44)  HR: 78 (09-22-18 @ 08:37) (76 - 87)  BP: 109/66 (09-22-18 @ 08:37) (109/66 - 145/89)  BP(mean): --  RR: 16 (09-22-18 @ 08:37) (16 - 18)  SpO2: 96% (09-22-18 @ 08:37) (96% - 97%)  Wt(kg): --    PHYSICAL EXAM:    Constitutional: WDWN resting comfortably in bed; NAD  Head: NC/AT  Eyes: PERRL, EOMI, anicteric sclera  Respiratory: CTA B/L; no W/R/R, no retractions  Cardiac: +S1/S2; RRR; no M/R/G; PMI non-displaced  Gastrointestinal: abdomen soft, mild distention; mild generalized discomfort to palpation; +BSx4  Extremities: WWP, no clubbing or cyanosis; no peripheral edema  Vascular: 2+ radial, femoral, DP/PT pulses B/L  Dermatologic: skin warm, dry and intact; no rashes, wounds, or scars  Neurologic: AAOx3; no focal deficits    .  LABS:                         10.3   7.0   )-----------( 180      ( 22 Sep 2018 06:52 )             30.2     09-22    141  |  106  |  5<L>  ----------------------------<  98  3.3<L>   |  29  |  0.97    Ca    8.8      22 Sep 2018 06:52  Mg     1.7     09-22      PT/INR - ( 22 Sep 2018 06:52 )   PT: 12.0 sec;   INR: 1.08          PTT - ( 22 Sep 2018 06:52 )  PTT:30.1 sec        MEDICATIONS  (STANDING):  acetaminophen   Tablet .. 975 milliGRAM(s) Oral every 8 hours  busPIRone 20 milliGRAM(s) Oral <User Schedule>  diazepam    Tablet 5 milliGRAM(s) Oral every 8 hours  docusate sodium 100 milliGRAM(s) Oral three times a day  fentaNYL   Patch  25 MICROgram(s)/Hr. 1 Patch Transdermal every 48 hours  FLUoxetine 40 milliGRAM(s) Oral daily  lactated ringers. 1000 milliLiter(s) (75 mL/Hr) IV Continuous <Continuous>  polyethylene glycol 3350 17 Gram(s) Oral daily  senna 1 Tablet(s) Oral daily  simethicone 80 milliGRAM(s) Chew every 6 hours    MEDICATIONS  (PRN):  diphenhydrAMINE   Injectable 12.5 milliGRAM(s) IV Push every 4 hours PRN Rash and/or Itching  HYDROmorphone  Injectable 1.5 milliGRAM(s) IV Push every 4 hours PRN Breakthrough pain  ondansetron Injectable 4 milliGRAM(s) IV Push every 6 hours PRN Postoperative Nausea and/or Vomiting if reglan is inadequate        RADIOLOGY, EKG & ADDITIONAL TESTS: Reviewed.

## 2018-09-22 NOTE — DIETITIAN INITIAL EVALUATION ADULT. - OTHER INFO
22F admitted with abdominal pain, decreased PO intake, N/V x3 days and constipation. Hx of endometriosis, urinary retention. Pt now with GIB and diarrhea, being monitored for further bleeding. Fair tolerance to clears noted. No further n/v/c, pain noted to abdomen, no chewing/ swallowing issues, skin w incisions noted. No noted changes in UBW PTA, NKFA.

## 2018-09-22 NOTE — PROGRESS NOTE ADULT - PROBLEM SELECTOR PLAN 2
chronic urinary retention, but worse s/p surgery. nl cystoscopy, s/p b/l ureteral stents placed during sx this admission. failed 2 TOVs.  - c/w valium for bladder spasm  -- will need CT urogram and CT cystogram as per Urology to further evaluate for injury  -c/w Toradol  -PVR

## 2018-09-22 NOTE — PROGRESS NOTE ADULT - SUBJECTIVE AND OBJECTIVE BOX
coverage for Dr. Wilson    Pt seen and examined   hardwick back due to retention  ? rectal bleed  pain slightly better    REVIEW OF SYSTEMS:  Constitutional: No fever  Cardiovascular: No chest pain, palpitations, dizziness or leg swelling  Gastrointestinal: +abdominal pain. No nausea, vomiting or hematemesis; ?rectal bleed + diarrhea  Skin: No itching, burning, rashes or lesions       MEDICATIONS:  MEDICATIONS  (STANDING):  acetaminophen   Tablet .. 975 milliGRAM(s) Oral every 8 hours  busPIRone 20 milliGRAM(s) Oral <User Schedule>  diazepam    Tablet 5 milliGRAM(s) Oral every 8 hours  docusate sodium 100 milliGRAM(s) Oral three times a day  fentaNYL   Patch  25 MICROgram(s)/Hr. 1 Patch Transdermal every 48 hours  FLUoxetine 40 milliGRAM(s) Oral daily  lactated ringers. 1000 milliLiter(s) (75 mL/Hr) IV Continuous <Continuous>  polyethylene glycol 3350 17 Gram(s) Oral daily  senna 1 Tablet(s) Oral daily  simethicone 80 milliGRAM(s) Chew every 6 hours    MEDICATIONS  (PRN):  diphenhydrAMINE   Injectable 12.5 milliGRAM(s) IV Push every 4 hours PRN Rash and/or Itching  HYDROmorphone  Injectable 1.5 milliGRAM(s) IV Push every 4 hours PRN Breakthrough pain  ondansetron Injectable 4 milliGRAM(s) IV Push every 6 hours PRN Postoperative Nausea and/or Vomiting if reglan is inadequate      Allergies    morphine (Rash)  sulfa drugs (Unknown)    Intolerances        Vital Signs Last 24 Hrs  T(C): 36.9 (22 Sep 2018 08:37), Max: 37.1 (21 Sep 2018 14:05)  T(F): 98.4 (22 Sep 2018 08:37), Max: 98.8 (21 Sep 2018 14:05)  HR: 78 (22 Sep 2018 08:37) (73 - 87)  BP: 109/66 (22 Sep 2018 08:37) (109/66 - 145/89)  BP(mean): --  RR: 16 (22 Sep 2018 08:37) (16 - 18)  SpO2: 96% (22 Sep 2018 08:37) (96% - 97%)    09-21 @ 07:01  -  09-22 @ 07:00  --------------------------------------------------------  IN: 990 mL / OUT: 1775 mL / NET: -785 mL        PHYSICAL EXAM:    General: in no acute distress  HEENT: MMM, conjunctiva and sclera clear  Lungs: clear  Heart: regular  Gastrointestinal: Soft non-tender non-distended; Normal bowel sounds; hardwick in  Skin: Warm and dry. No obvious rash    LABS:      CBC Full  -  ( 22 Sep 2018 06:52 )  WBC Count : 7.0 K/uL  Hemoglobin : 10.3 g/dL  Hematocrit : 30.2 %  Platelet Count - Automated : 180 K/uL  Mean Cell Volume : 88.3 fL  Mean Cell Hemoglobin : 30.1 pg  Mean Cell Hemoglobin Concentration : 34.1 g/dL  Auto Neutrophil # : x  Auto Lymphocyte # : x  Auto Monocyte # : x  Auto Eosinophil # : x  Auto Basophil # : x  Auto Neutrophil % : x  Auto Lymphocyte % : x  Auto Monocyte % : x  Auto Eosinophil % : x  Auto Basophil % : x    09-22    141  |  106  |  5<L>  ----------------------------<  98  3.3<L>   |  29  |  0.97    Ca    8.8      22 Sep 2018 06:52  Mg     1.7     09-22      PT/INR - ( 22 Sep 2018 06:52 )   PT: 12.0 sec;   INR: 1.08          PTT - ( 22 Sep 2018 06:52 )  PTT:30.1 sec                  RADIOLOGY & ADDITIONAL STUDIES (The following images were personally reviewed):

## 2018-09-22 NOTE — DIETITIAN INITIAL EVALUATION ADULT. - PROBLEM SELECTOR PLAN 4
States taking Fluoxetine 40, Buspirone 20, Valium 20, [id number] prn, Prozac 90q weekly.    continued home Fluoxetine and Buspirone.   - Please obtain med rec and start home meds as tolerated

## 2018-09-22 NOTE — PROGRESS NOTE ADULT - ASSESSMENT
21yo POD4/HD6 s/p diagnostic laparoscopy, endometriosis excision, appendectomy and cystoscopy. Pt is hemodynamically stable.   -primary management per medicine     Neuro: Toradol and Tylenol standing, Dilaudid 1.5mg PRN. Fentanyl patch 25mcg every 48hrs   Anxiety: continue Prozac, Buspar and Diazepam TID   Cardio: No issues   Pulm: encourage ISS  GI: NPO tonight for possible stent placement tomorrow, Zofran prn. Simethicone and Colace    : currently with hardwick catheter #3; CT urogram shows hydronephrosis will delayed emptying of right kidney --> possible stent placement by urology tomorrow; Dr. Wiggins to reevaluate in AM   Activity: ambulate as tolerated   DVT ppx: SCDs, ambulation   Follow up pain management, medicine recommendations   Social work- follow up drug rehab placement outpatient

## 2018-09-22 NOTE — PROGRESS NOTE ADULT - PROBLEM SELECTOR PLAN 1
s/p laparsospy for  endometriosis and appendectomy  - will need CT urogram and CT cystogram as per Urology to further evaluate for injury  -c/w Toradol  (until sunday) and Tylenol standing, Dilaudid 1.5mg PRN. Fentanyl patch 25mcg every 48hrs for pain control  -pain mgmt following, follow recs    #nausea  -zofran PRN    #BRBPR  -pt endorses 1 episode of a small amount of BRBPR (s/p appendectomy and rectal tube placement) when passing gas. hb is 12 and stable. rectal exam revealed no hemorrhoids, anal tears, and no gross blood or melena.  -HgB stable on repeat CBC   -active T+S  -monitor VS, s/s of GI bleed  -if recurs/ persists or if VS decline--> consider gi consult, start ppi, stop ketorolac

## 2018-09-22 NOTE — PROGRESS NOTE ADULT - SUBJECTIVE AND OBJECTIVE BOX
CT reviewed by  resident and attending.  No concerns for ureteral injury at present.  Attending Dr Wiggins discussed with patient.  Monitor for worsening pain.  Possible cysto, ureteral stent placement Sunday if worsening pain.  Keep NPO after midnight.

## 2018-09-22 NOTE — PROGRESS NOTE ADULT - ASSESSMENT
21 yo F w/ pmh of chronic abdominal pain, endometriosis s/p dx lap in 2015 and 2016, ovarian cysts, anxiety, and asthma presenting with RUQ pain, nausea vomiting for the past 3 days s/p appendectomy and endometriosis removal. Transferred to [facility] for pain control and urinary retention.

## 2018-09-22 NOTE — PROGRESS NOTE ADULT - SUBJECTIVE AND OBJECTIVE BOX
Pt evaluated at bedside with Dr. Willis  She reports pain is worse today, currently 8/10. Located mostly in low abdomen.  She is tolerating regular diet though pt notes she has reduced appetite. She is passing flatus and ambulating.     T(C): 37.1 (09-21-18 @ 05:38), Max: 37.1 (09-21-18 @ 05:38)  HR: 73 (09-21-18 @ 05:38) (73 - 100)  BP: 120/78 (09-21-18 @ 05:38) (120/78 - 127/74)  RR: 18 (09-21-18 @ 05:38) (17 - 18)  SpO2: 98% (09-21-18 @ 05:38) (98% - 98%)  GA: NAD  Abd: soft, diffusely tender to palpation, no rebound or guarding, incisions c/d/i  Extrem: SCDs in place    09-20 @ 07:01  -  09-21 @ 07:00  --------------------------------------------------------  IN: 2565 mL / OUT: 2500 mL / NET: 65 mL DELAY IN NOTE DUE TO PATIENT CARE    Pt evaluated at bedside with Dr. Willis  She reports pain is worse today, currently 8/10. Located mostly in low abdomen.  She is tolerating regular diet though pt notes she has reduced appetite. She is passing flatus and ambulating.     T(C): 37.1 (09-21-18 @ 05:38), Max: 37.1 (09-21-18 @ 05:38)  HR: 73 (09-21-18 @ 05:38) (73 - 100)  BP: 120/78 (09-21-18 @ 05:38) (120/78 - 127/74)  RR: 18 (09-21-18 @ 05:38) (17 - 18)  SpO2: 98% (09-21-18 @ 05:38) (98% - 98%)  GA: NAD  Abd: soft, diffusely tender to palpation, no rebound or guarding, incisions c/d/i  Extrem: SCDs in place    09-20 @ 07:01  -  09-21 @ 07:00  --------------------------------------------------------  IN: 2565 mL / OUT: 2500 mL / NET: 65 mL

## 2018-09-22 NOTE — CHART NOTE - NSCHARTNOTEFT_GEN_A_CORE
Obtained CT urogram as per Uro's request.    CT abdomen pelvis w/wo contrast revealed:  new mild right  hydroureteronephrosis and delayed function by the right kidney.  Slightly asymmetric edema at the right ureterovesical junction could be the cause of the right obstructive uropathy and Small hemoperitoneum in pelvis.    Vital Signs: T: 99.1 HR: 92  BP: 140/93, RR: 16, 96% on RA    Pt examined @ bedside: Pt looks uncomfortable and abdomen tense and tender upon palpation with peritoneal signs.   Urology resident Dr. Ortega spoke to Urology attending Dr. Wiggins and made him aware of the CT findings.   Gyn Resident was called and asked to evaluate pt. Obtained CT urogram as per Uro's request.    CT abdomen pelvis w/wo contrast revealed:  new mild right  hydroureteronephrosis and delayed function by the right kidney.  Slightly asymmetric edema at the right ureterovesical junction could be the cause of the right obstructive uropathy and Small hemoperitoneum in pelvis.    Vital Signs: T: 99.1 HR: 92  BP: 140/93, RR: 16, 96% on RA    Pt examined @ bedside: Pt looks uncomfortable and abdomen tense and tender upon palpation with peritoneal signs.   Urology resident Dr. Ortega spoke to Urology attending Dr. Wiggins and made him aware of the CT findings.   Gyn Resident was called and asked to evaluate pt.  Spoke to Dr. Rose about findings and he also feels pt should be placed on Urology or Gyn Service  Called Dr. Wiggins and states he feels this is not completely a urologic issue and is awaiting gyn reccs. Obtained CT urogram as per Uro's request.    CT abdomen pelvis w/wo contrast revealed:  new mild right  hydroureteronephrosis and delayed function by the right kidney.  Slightly asymmetric edema at the right ureterovesical junction could be the cause of the right obstructive uropathy and Small hemoperitoneum in pelvis.    Vital Signs: T: 99.1 HR: 92  BP: 140/93, RR: 16, 96% on RA    Pt examined @ bedside: Pt looks uncomfortable and abdomen tense and tender upon palpation with peritoneal signs.   Urology resident Dr. Ortega spoke to Urology attending Dr. Wiggins and made him aware of the CT findings.   Gyn Resident was called and asked to evaluate pt.  Spoke to Dr. Rose about findings and he also feels pt should be placed on Urology or Gyn Service  Called Dr. Wiggins and states he feels this is not completely a urologic issue and is awaiting gyn reccs. States he will see her today. Obtained CT urogram as per Uro's request.    CT abdomen pelvis w/wo contrast revealed:  new mild right  hydroureteronephrosis and delayed function by the right kidney.  Slightly asymmetric edema at the right ureterovesical junction could be the cause of the right obstructive uropathy and Small hemoperitoneum in pelvis. Free air within the abdomen and pelvis and subcutaneous emphysema along the left anterior abdominal wall is likely due to recent surgery.    Vital Signs: T: 99.1 HR: 92  BP: 140/93, RR: 16, 96% on RA    Pt examined @ bedside: Pt looks uncomfortable and abdomen tense and tender upon palpation with peritoneal signs.   Urology resident Dr. Ortega spoke to Urology attending Dr. Wiggins and made him aware of the CT findings.   Gyn Resident was called and updated about new CT findings. Asked to come and evaluate pt urgently.   Spoke to Dr. Rose about findings and he feels that pt would be further served on surgical service. Per Dr. Rose's request called Dr. Wiggins and states he feels this is not completely a urologic issue and is awaiting gyn reccs. States he will see her today.       Discussed above with PGY-3 and Dr. Shirley

## 2018-09-23 LAB
ALBUMIN SERPL ELPH-MCNC: 3.6 G/DL — SIGNIFICANT CHANGE UP (ref 3.3–5)
ALP SERPL-CCNC: 60 U/L — SIGNIFICANT CHANGE UP (ref 40–120)
ALT FLD-CCNC: 14 U/L — SIGNIFICANT CHANGE UP (ref 10–45)
ANION GAP SERPL CALC-SCNC: 7 MMOL/L — SIGNIFICANT CHANGE UP (ref 5–17)
AST SERPL-CCNC: 15 U/L — SIGNIFICANT CHANGE UP (ref 10–40)
BILIRUB SERPL-MCNC: 0.2 MG/DL — SIGNIFICANT CHANGE UP (ref 0.2–1.2)
BUN SERPL-MCNC: 7 MG/DL — SIGNIFICANT CHANGE UP (ref 7–23)
CALCIUM SERPL-MCNC: 8.8 MG/DL — SIGNIFICANT CHANGE UP (ref 8.4–10.5)
CHLORIDE SERPL-SCNC: 104 MMOL/L — SIGNIFICANT CHANGE UP (ref 96–108)
CO2 SERPL-SCNC: 29 MMOL/L — SIGNIFICANT CHANGE UP (ref 22–31)
CREAT SERPL-MCNC: 1.03 MG/DL — SIGNIFICANT CHANGE UP (ref 0.5–1.3)
GLUCOSE SERPL-MCNC: 106 MG/DL — HIGH (ref 70–99)
HCT VFR BLD CALC: 30.1 % — LOW (ref 34.5–45)
HGB BLD-MCNC: 10 G/DL — LOW (ref 11.5–15.5)
MCHC RBC-ENTMCNC: 29.7 PG — SIGNIFICANT CHANGE UP (ref 27–34)
MCHC RBC-ENTMCNC: 33.2 G/DL — SIGNIFICANT CHANGE UP (ref 32–36)
MCV RBC AUTO: 89.3 FL — SIGNIFICANT CHANGE UP (ref 80–100)
PLATELET # BLD AUTO: 172 K/UL — SIGNIFICANT CHANGE UP (ref 150–400)
POTASSIUM SERPL-MCNC: 3.7 MMOL/L — SIGNIFICANT CHANGE UP (ref 3.5–5.3)
POTASSIUM SERPL-SCNC: 3.7 MMOL/L — SIGNIFICANT CHANGE UP (ref 3.5–5.3)
PROT SERPL-MCNC: 5.9 G/DL — LOW (ref 6–8.3)
RBC # BLD: 3.37 M/UL — LOW (ref 3.8–5.2)
RBC # FLD: 12.4 % — SIGNIFICANT CHANGE UP (ref 10.3–16.9)
SODIUM SERPL-SCNC: 140 MMOL/L — SIGNIFICANT CHANGE UP (ref 135–145)
WBC # BLD: 5.1 K/UL — SIGNIFICANT CHANGE UP (ref 3.8–10.5)
WBC # FLD AUTO: 5.1 K/UL — SIGNIFICANT CHANGE UP (ref 3.8–10.5)

## 2018-09-23 RX ADMIN — Medication 12.5 MILLIGRAM(S): at 12:51

## 2018-09-23 RX ADMIN — Medication 100 MILLIGRAM(S): at 06:11

## 2018-09-23 RX ADMIN — Medication 100 MILLIGRAM(S): at 22:13

## 2018-09-23 RX ADMIN — HYDROMORPHONE HYDROCHLORIDE 1.5 MILLIGRAM(S): 2 INJECTION INTRAMUSCULAR; INTRAVENOUS; SUBCUTANEOUS at 00:00

## 2018-09-23 RX ADMIN — Medication 12.5 MILLIGRAM(S): at 00:01

## 2018-09-23 RX ADMIN — HYDROMORPHONE HYDROCHLORIDE 1.5 MILLIGRAM(S): 2 INJECTION INTRAMUSCULAR; INTRAVENOUS; SUBCUTANEOUS at 09:50

## 2018-09-23 RX ADMIN — Medication 20 MILLIGRAM(S): at 18:03

## 2018-09-23 RX ADMIN — Medication 12.5 MILLIGRAM(S): at 04:40

## 2018-09-23 RX ADMIN — Medication 975 MILLIGRAM(S): at 07:38

## 2018-09-23 RX ADMIN — HYDROMORPHONE HYDROCHLORIDE 1.5 MILLIGRAM(S): 2 INJECTION INTRAMUSCULAR; INTRAVENOUS; SUBCUTANEOUS at 22:13

## 2018-09-23 RX ADMIN — SIMETHICONE 80 MILLIGRAM(S): 80 TABLET, CHEWABLE ORAL at 11:17

## 2018-09-23 RX ADMIN — HYDROMORPHONE HYDROCHLORIDE 1.5 MILLIGRAM(S): 2 INJECTION INTRAMUSCULAR; INTRAVENOUS; SUBCUTANEOUS at 04:56

## 2018-09-23 RX ADMIN — Medication 5 MILLIGRAM(S): at 06:11

## 2018-09-23 RX ADMIN — HYDROMORPHONE HYDROCHLORIDE 1.5 MILLIGRAM(S): 2 INJECTION INTRAMUSCULAR; INTRAVENOUS; SUBCUTANEOUS at 00:15

## 2018-09-23 RX ADMIN — HYDROMORPHONE HYDROCHLORIDE 1.5 MILLIGRAM(S): 2 INJECTION INTRAMUSCULAR; INTRAVENOUS; SUBCUTANEOUS at 04:41

## 2018-09-23 RX ADMIN — Medication 975 MILLIGRAM(S): at 16:08

## 2018-09-23 RX ADMIN — Medication 12.5 MILLIGRAM(S): at 22:13

## 2018-09-23 RX ADMIN — HYDROMORPHONE HYDROCHLORIDE 1.5 MILLIGRAM(S): 2 INJECTION INTRAMUSCULAR; INTRAVENOUS; SUBCUTANEOUS at 12:51

## 2018-09-23 RX ADMIN — Medication 12.5 MILLIGRAM(S): at 16:56

## 2018-09-23 RX ADMIN — SENNA PLUS 1 TABLET(S): 8.6 TABLET ORAL at 11:17

## 2018-09-23 RX ADMIN — Medication 40 MILLIGRAM(S): at 11:18

## 2018-09-23 RX ADMIN — ONDANSETRON 4 MILLIGRAM(S): 8 TABLET, FILM COATED ORAL at 14:39

## 2018-09-23 RX ADMIN — HYDROMORPHONE HYDROCHLORIDE 1.5 MILLIGRAM(S): 2 INJECTION INTRAMUSCULAR; INTRAVENOUS; SUBCUTANEOUS at 18:00

## 2018-09-23 RX ADMIN — Medication 5 MILLIGRAM(S): at 22:13

## 2018-09-23 RX ADMIN — HYDROMORPHONE HYDROCHLORIDE 1.5 MILLIGRAM(S): 2 INJECTION INTRAMUSCULAR; INTRAVENOUS; SUBCUTANEOUS at 08:51

## 2018-09-23 RX ADMIN — SIMETHICONE 80 MILLIGRAM(S): 80 TABLET, CHEWABLE ORAL at 18:03

## 2018-09-23 RX ADMIN — Medication 100 MILLIGRAM(S): at 14:39

## 2018-09-23 RX ADMIN — HYDROMORPHONE HYDROCHLORIDE 1.5 MILLIGRAM(S): 2 INJECTION INTRAMUSCULAR; INTRAVENOUS; SUBCUTANEOUS at 16:56

## 2018-09-23 RX ADMIN — Medication 12.5 MILLIGRAM(S): at 08:51

## 2018-09-23 RX ADMIN — SIMETHICONE 80 MILLIGRAM(S): 80 TABLET, CHEWABLE ORAL at 06:11

## 2018-09-23 RX ADMIN — HYDROMORPHONE HYDROCHLORIDE 1.5 MILLIGRAM(S): 2 INJECTION INTRAMUSCULAR; INTRAVENOUS; SUBCUTANEOUS at 13:55

## 2018-09-23 RX ADMIN — POLYETHYLENE GLYCOL 3350 17 GRAM(S): 17 POWDER, FOR SOLUTION ORAL at 11:17

## 2018-09-23 RX ADMIN — Medication 5 MILLIGRAM(S): at 14:39

## 2018-09-23 RX ADMIN — FENTANYL CITRATE 1 PATCH: 50 INJECTION INTRAVENOUS at 11:17

## 2018-09-23 NOTE — PROGRESS NOTE ADULT - SUBJECTIVE AND OBJECTIVE BOX
GYN PROGRESS NOTE    Patient evaluated at the bedside with attending Dr. Willis. No acute events. Patient reports pain is controlled. She ambulated earlier today and agrees to ambulate more this evening. She is passing flatus and tolerating a regular diet. No complaints at this time.     O:   T(C): 36.7 (09-23-18 @ 15:49), Max: 37 (09-22-18 @ 20:20)  HR: 82 (09-23-18 @ 15:49) (74 - 85)  BP: 120/69 (09-23-18 @ 15:49) (108/70 - 122/77)  RR: 18 (09-23-18 @ 15:49) (16 - 18)  SpO2: 98% (09-23-18 @ 15:49) (95% - 98%)  Wt(kg): --    GEN: resting comfortably, father at bedside   LUNGS: no respiratory distress  ABD: soft, mildly tender to deep palpation, right CVA tenderness, not distended   EXT: no calf tenderness        09-22 @ 07:01  -  09-23 @ 07:00  --------------------------------------------------------  IN: 900 mL / OUT: 2800 mL / NET: -1900 mL

## 2018-09-23 NOTE — PROGRESS NOTE ADULT - SUBJECTIVE AND OBJECTIVE BOX
Pt seen and examined   coverage for Dr. Steve murillo  less pain  BM = diarrhea no bleed today    REVIEW OF SYSTEMS:  Constitutional: No fever,   Cardiovascular: No chest pain, palpitations, dizziness or leg swelling  Gastrointestinal: No abdominal or epigastric pain. No nausea, vomiting ; + diarrhea . No melena or hematochezia.  Skin: No itching, burning, rashes or lesions       MEDICATIONS:  MEDICATIONS  (STANDING):  acetaminophen   Tablet .. 975 milliGRAM(s) Oral every 8 hours  busPIRone 20 milliGRAM(s) Oral <User Schedule>  diazepam    Tablet 5 milliGRAM(s) Oral every 8 hours  docusate sodium 100 milliGRAM(s) Oral three times a day  fentaNYL   Patch  25 MICROgram(s)/Hr. 1 Patch Transdermal every 48 hours  FLUoxetine 40 milliGRAM(s) Oral daily  lactated ringers. 1000 milliLiter(s) (75 mL/Hr) IV Continuous <Continuous>  polyethylene glycol 3350 17 Gram(s) Oral daily  senna 1 Tablet(s) Oral daily  simethicone 80 milliGRAM(s) Chew every 6 hours    MEDICATIONS  (PRN):  diphenhydrAMINE   Injectable 12.5 milliGRAM(s) IV Push every 4 hours PRN Rash and/or Itching  HYDROmorphone  Injectable 1.5 milliGRAM(s) IV Push every 4 hours PRN Breakthrough pain  ondansetron Injectable 4 milliGRAM(s) IV Push every 6 hours PRN Postoperative Nausea and/or Vomiting if reglan is inadequate      Allergies    morphine (Rash)  sulfa drugs (Unknown)    Intolerances        Vital Signs Last 24 Hrs  T(C): 36.7 (23 Sep 2018 08:43), Max: 37.3 (22 Sep 2018 15:47)  T(F): 98 (23 Sep 2018 08:43), Max: 99.1 (22 Sep 2018 15:47)  HR: 74 (23 Sep 2018 08:43) (74 - 92)  BP: 108/70 (23 Sep 2018 08:43) (108/70 - 140/93)  BP(mean): --  RR: 16 (23 Sep 2018 08:43) (16 - 18)  SpO2: 95% (23 Sep 2018 08:43) (95% - 98%)    09-22 @ 07:01  -  09-23 @ 07:00  --------------------------------------------------------  IN: 900 mL / OUT: 2800 mL / NET: -1900 mL        PHYSICAL EXAM:    General: ; in no acute distress  HEENT: MMM, conjunctiva and sclera clear  Lungs: clear  Heart: regular  Gastrointestinal: Soft non-tender mild-distended; Normal bowel sounds;   Skin: Warm and dry. No obvious rash    LABS:      CBC Full  -  ( 23 Sep 2018 08:23 )  WBC Count : 5.1 K/uL  Hemoglobin : 10.0 g/dL  Hematocrit : 30.1 %  Platelet Count - Automated : 172 K/uL  Mean Cell Volume : 89.3 fL  Mean Cell Hemoglobin : 29.7 pg  Mean Cell Hemoglobin Concentration : 33.2 g/dL  Auto Neutrophil # : x  Auto Lymphocyte # : x  Auto Monocyte # : x  Auto Eosinophil # : x  Auto Basophil # : x  Auto Neutrophil % : x  Auto Lymphocyte % : x  Auto Monocyte % : x  Auto Eosinophil % : x  Auto Basophil % : x    09-23    140  |  104  |  7   ----------------------------<  106<H>  3.7   |  29  |  1.03    Ca    8.8      23 Sep 2018 08:23  Mg     1.7     09-22    TPro  5.9<L>  /  Alb  3.6  /  TBili  0.2  /  DBili  x   /  AST  15  /  ALT  14  /  AlkPhos  60  09-23    PT/INR - ( 22 Sep 2018 06:52 )   PT: 12.0 sec;   INR: 1.08          PTT - ( 22 Sep 2018 06:52 )  PTT:30.1 sec                  RADIOLOGY & ADDITIONAL STUDIES (The following images were personally reviewed):

## 2018-09-24 LAB
ACRM MODULATING ANTIBODY: 0 NMOL/L — SIGNIFICANT CHANGE UP
ANION GAP SERPL CALC-SCNC: 13 MMOL/L — SIGNIFICANT CHANGE UP (ref 5–17)
BLD GP AB SCN SERPL QL: NEGATIVE — SIGNIFICANT CHANGE UP
BUN SERPL-MCNC: 7 MG/DL — SIGNIFICANT CHANGE UP (ref 7–23)
CALCIUM SERPL-MCNC: 8.7 MG/DL — SIGNIFICANT CHANGE UP (ref 8.4–10.5)
CHLORIDE SERPL-SCNC: 101 MMOL/L — SIGNIFICANT CHANGE UP (ref 96–108)
CO2 SERPL-SCNC: 26 MMOL/L — SIGNIFICANT CHANGE UP (ref 22–31)
CREAT SERPL-MCNC: 0.93 MG/DL — SIGNIFICANT CHANGE UP (ref 0.5–1.3)
GLUCOSE SERPL-MCNC: 104 MG/DL — HIGH (ref 70–99)
HCT VFR BLD CALC: 31.2 % — LOW (ref 34.5–45)
HGB BLD-MCNC: 10.3 G/DL — LOW (ref 11.5–15.5)
MAGNESIUM SERPL-MCNC: 1.9 MG/DL — SIGNIFICANT CHANGE UP (ref 1.6–2.6)
MCHC RBC-ENTMCNC: 29.7 PG — SIGNIFICANT CHANGE UP (ref 27–34)
MCHC RBC-ENTMCNC: 33 G/DL — SIGNIFICANT CHANGE UP (ref 32–36)
MCV RBC AUTO: 89.9 FL — SIGNIFICANT CHANGE UP (ref 80–100)
PLATELET # BLD AUTO: 176 K/UL — SIGNIFICANT CHANGE UP (ref 150–400)
POTASSIUM SERPL-MCNC: 3.9 MMOL/L — SIGNIFICANT CHANGE UP (ref 3.5–5.3)
POTASSIUM SERPL-SCNC: 3.9 MMOL/L — SIGNIFICANT CHANGE UP (ref 3.5–5.3)
RBC # BLD: 3.47 M/UL — LOW (ref 3.8–5.2)
RBC # FLD: 12.4 % — SIGNIFICANT CHANGE UP (ref 10.3–16.9)
RH IG SCN BLD-IMP: POSITIVE — SIGNIFICANT CHANGE UP
SODIUM SERPL-SCNC: 140 MMOL/L — SIGNIFICANT CHANGE UP (ref 135–145)
SURGICAL PATHOLOGY STUDY: SIGNIFICANT CHANGE UP
WBC # BLD: 4.8 K/UL — SIGNIFICANT CHANGE UP (ref 3.8–10.5)
WBC # FLD AUTO: 4.8 K/UL — SIGNIFICANT CHANGE UP (ref 3.8–10.5)

## 2018-09-24 PROCEDURE — 74178 CT ABD&PLV WO CNTR FLWD CNTR: CPT | Mod: 26

## 2018-09-24 RX ORDER — OXYCODONE HYDROCHLORIDE 5 MG/1
15 TABLET ORAL EVERY 4 HOURS
Qty: 0 | Refills: 0 | Status: DISCONTINUED | OUTPATIENT
Start: 2018-09-24 | End: 2018-09-24

## 2018-09-24 RX ORDER — HYDROMORPHONE HYDROCHLORIDE 2 MG/ML
1.5 INJECTION INTRAMUSCULAR; INTRAVENOUS; SUBCUTANEOUS EVERY 4 HOURS
Qty: 0 | Refills: 0 | Status: DISCONTINUED | OUTPATIENT
Start: 2018-09-24 | End: 2018-09-25

## 2018-09-24 RX ADMIN — Medication 100 MILLIGRAM(S): at 22:23

## 2018-09-24 RX ADMIN — Medication 12.5 MILLIGRAM(S): at 02:49

## 2018-09-24 RX ADMIN — SENNA PLUS 1 TABLET(S): 8.6 TABLET ORAL at 12:54

## 2018-09-24 RX ADMIN — Medication 975 MILLIGRAM(S): at 03:42

## 2018-09-24 RX ADMIN — Medication 12.5 MILLIGRAM(S): at 06:42

## 2018-09-24 RX ADMIN — Medication 12.5 MILLIGRAM(S): at 14:30

## 2018-09-24 RX ADMIN — Medication 5 MILLIGRAM(S): at 22:23

## 2018-09-24 RX ADMIN — Medication 100 MILLIGRAM(S): at 13:46

## 2018-09-24 RX ADMIN — HYDROMORPHONE HYDROCHLORIDE 1.5 MILLIGRAM(S): 2 INJECTION INTRAMUSCULAR; INTRAVENOUS; SUBCUTANEOUS at 02:30

## 2018-09-24 RX ADMIN — HYDROMORPHONE HYDROCHLORIDE 1.5 MILLIGRAM(S): 2 INJECTION INTRAMUSCULAR; INTRAVENOUS; SUBCUTANEOUS at 01:36

## 2018-09-24 RX ADMIN — HYDROMORPHONE HYDROCHLORIDE 1.5 MILLIGRAM(S): 2 INJECTION INTRAMUSCULAR; INTRAVENOUS; SUBCUTANEOUS at 18:30

## 2018-09-24 RX ADMIN — HYDROMORPHONE HYDROCHLORIDE 1.5 MILLIGRAM(S): 2 INJECTION INTRAMUSCULAR; INTRAVENOUS; SUBCUTANEOUS at 06:43

## 2018-09-24 RX ADMIN — Medication 20 MILLIGRAM(S): at 17:31

## 2018-09-24 RX ADMIN — HYDROMORPHONE HYDROCHLORIDE 1.5 MILLIGRAM(S): 2 INJECTION INTRAMUSCULAR; INTRAVENOUS; SUBCUTANEOUS at 22:47

## 2018-09-24 RX ADMIN — Medication 5 MILLIGRAM(S): at 13:46

## 2018-09-24 RX ADMIN — Medication 975 MILLIGRAM(S): at 00:40

## 2018-09-24 RX ADMIN — ONDANSETRON 4 MILLIGRAM(S): 8 TABLET, FILM COATED ORAL at 13:46

## 2018-09-24 RX ADMIN — HYDROMORPHONE HYDROCHLORIDE 1.5 MILLIGRAM(S): 2 INJECTION INTRAMUSCULAR; INTRAVENOUS; SUBCUTANEOUS at 23:02

## 2018-09-24 RX ADMIN — HYDROMORPHONE HYDROCHLORIDE 1.5 MILLIGRAM(S): 2 INJECTION INTRAMUSCULAR; INTRAVENOUS; SUBCUTANEOUS at 07:13

## 2018-09-24 RX ADMIN — HYDROMORPHONE HYDROCHLORIDE 1.5 MILLIGRAM(S): 2 INJECTION INTRAMUSCULAR; INTRAVENOUS; SUBCUTANEOUS at 10:35

## 2018-09-24 RX ADMIN — Medication 975 MILLIGRAM(S): at 16:15

## 2018-09-24 RX ADMIN — SIMETHICONE 80 MILLIGRAM(S): 80 TABLET, CHEWABLE ORAL at 12:54

## 2018-09-24 RX ADMIN — Medication 12.5 MILLIGRAM(S): at 18:30

## 2018-09-24 RX ADMIN — Medication 975 MILLIGRAM(S): at 17:15

## 2018-09-24 RX ADMIN — HYDROMORPHONE HYDROCHLORIDE 1.5 MILLIGRAM(S): 2 INJECTION INTRAMUSCULAR; INTRAVENOUS; SUBCUTANEOUS at 02:45

## 2018-09-24 RX ADMIN — SODIUM CHLORIDE 75 MILLILITER(S): 9 INJECTION, SOLUTION INTRAVENOUS at 00:40

## 2018-09-24 RX ADMIN — SIMETHICONE 80 MILLIGRAM(S): 80 TABLET, CHEWABLE ORAL at 17:31

## 2018-09-24 RX ADMIN — Medication 12.5 MILLIGRAM(S): at 10:34

## 2018-09-24 RX ADMIN — SIMETHICONE 80 MILLIGRAM(S): 80 TABLET, CHEWABLE ORAL at 00:41

## 2018-09-24 RX ADMIN — Medication 12.5 MILLIGRAM(S): at 22:46

## 2018-09-24 RX ADMIN — Medication 40 MILLIGRAM(S): at 12:53

## 2018-09-24 RX ADMIN — SODIUM CHLORIDE 75 MILLILITER(S): 9 INJECTION, SOLUTION INTRAVENOUS at 12:54

## 2018-09-24 RX ADMIN — HYDROMORPHONE HYDROCHLORIDE 1.5 MILLIGRAM(S): 2 INJECTION INTRAMUSCULAR; INTRAVENOUS; SUBCUTANEOUS at 14:31

## 2018-09-24 NOTE — PROGRESS NOTE ADULT - ASSESSMENT
21yo POD6/HD8 s/p diagnostic laparoscopy, endometriosis excision, appendectomy and cystoscopy. Pt is hemodynamically stable.   -primary management per medicine     Neuro: Toradol and Tylenol standing, Dilaudid 1.5mg PRN. Fentanyl patch 25mcg every 48hrs   Anxiety: continue Prozac, Buspar and Diazepam TID   Cardio: No issues   Pulm: encourage ISS  GI: regular diet, Zofran prn. Simethicone and Colace    : currently with hardwick catheter #3; CT performed this afternoon shows resolved right hydronephrosis, plan of care per urology and primary medicine team  Activity: ambulate as tolerated   DVT ppx: SCDs, ambulation   Follow up pain management, medicine recommendations   Social work- follow up drug rehab placement outpatient

## 2018-09-24 NOTE — PROGRESS NOTE ADULT - SUBJECTIVE AND OBJECTIVE BOX
Neurology Follow up note    Name  ANU SAN    HPI:  22 yoF w/ pmh of chronic abdominal pain, endometriosis s/p dx lap in 2015 and 2016, ovarian cysts, anxiety, and asthma presenting with RUQ pain, nausea vomiting for the past 3 days. Pt states that her chronic pain in RLQ but 3 days she started experiencing RUQ pain that is sharp in nature and radiates to chest. Poor PO intake since onset and pt has NBNB vomiting everytime she eats. No diarrhea. No fevers but felt cold at home.   ROS negative for headache, dizziness, SOB. Patient with chronic hx of urinary retention, recieved injections? for muscle spasticity.   GI hx significant for colonoscopies and endoscopies in the past showing  gastritis and colitis of unknown origin.   In Er, VSS. No leukocytosis. Ct abd showing possible appendicitis, patient seen by Surgery- per recs unlikely appedicitis can folowup opt. No gallstones on RUQ US. Seen by GYn, pain unlikely GNY in etiology. Recommended opt f/u (17 Sep 2018 03:13)      Interval History - no change in neuro status- no new focal weakness in the UE/ LE        REVIEW OF SYSTEMS    Vital Signs Last 24 Hrs  T(C): 36.7 (24 Sep 2018 06:04), Max: 36.8 (23 Sep 2018 21:01)  T(F): 98 (24 Sep 2018 06:04), Max: 98.3 (23 Sep 2018 21:01)  HR: 74 (24 Sep 2018 06:04) (74 - 85)  BP: 116/76 (24 Sep 2018 06:04) (112/74 - 120/69)  BP(mean): --  RR: 17 (24 Sep 2018 06:04) (17 - 18)  SpO2: 99% (24 Sep 2018 06:04) (98% - 99%)    Physical Exam-     Mental Status- awake in pain    Cranial Nerves- full EOM    Gait and station- N/a    Motor- no focal weakness     Reflexes- intact    Sensation- no sensory level    Coordination- no tremors    Vascular - no bruits    Medications  acetaminophen   Tablet .. 975 milliGRAM(s) Oral every 8 hours  busPIRone 20 milliGRAM(s) Oral <User Schedule>  diazepam    Tablet 5 milliGRAM(s) Oral every 8 hours  diphenhydrAMINE   Injectable 12.5 milliGRAM(s) IV Push every 4 hours PRN  docusate sodium 100 milliGRAM(s) Oral three times a day  fentaNYL   Patch  25 MICROgram(s)/Hr. 1 Patch Transdermal every 48 hours  FLUoxetine 40 milliGRAM(s) Oral daily  HYDROmorphone  Injectable 1.5 milliGRAM(s) IV Push every 4 hours PRN  lactated ringers. 1000 milliLiter(s) IV Continuous <Continuous>  ondansetron Injectable 4 milliGRAM(s) IV Push every 6 hours PRN  polyethylene glycol 3350 17 Gram(s) Oral daily  senna 1 Tablet(s) Oral daily  simethicone 80 milliGRAM(s) Chew every 6 hours      Lab      Radiology    Assessment- No neuro deficits in the spine or LE    Plan defer to urology

## 2018-09-24 NOTE — PROGRESS NOTE ADULT - ASSESSMENT
21 yo F w/ pmh of chronic abdominal pain, endometriosis s/p dx lap in 2015 and 2016, ovarian cysts, anxiety, and asthma presenting with RUQ pain, nausea vomiting for the past 3 days s/p appendectomy and endometriosis removal. Transferred to [facility] for pain control and urinary retention.

## 2018-09-24 NOTE — PROGRESS NOTE ADULT - SUBJECTIVE AND OBJECTIVE BOX
coverage for Dr. Wilson      Pt seen and examined   new right flank pain  just returned from CT    REVIEW OF SYSTEMS:  Constitutional: No fever,   Cardiovascular: No chest pain, palpitations, dizziness or leg swelling  Gastrointestinal: + abdominal pain. No nausea, vomiting or hematemesis;  Skin: No itching, burning, rashes or lesions       MEDICATIONS:  MEDICATIONS  (STANDING):  acetaminophen   Tablet .. 975 milliGRAM(s) Oral every 8 hours  busPIRone 20 milliGRAM(s) Oral <User Schedule>  diazepam    Tablet 5 milliGRAM(s) Oral every 8 hours  docusate sodium 100 milliGRAM(s) Oral three times a day  fentaNYL   Patch  25 MICROgram(s)/Hr. 1 Patch Transdermal every 48 hours  FLUoxetine 40 milliGRAM(s) Oral daily  lactated ringers. 1000 milliLiter(s) (75 mL/Hr) IV Continuous <Continuous>  polyethylene glycol 3350 17 Gram(s) Oral daily  senna 1 Tablet(s) Oral daily  simethicone 80 milliGRAM(s) Chew every 6 hours    MEDICATIONS  (PRN):  diphenhydrAMINE   Injectable 12.5 milliGRAM(s) IV Push every 4 hours PRN Rash and/or Itching  HYDROmorphone  Injectable 1.5 milliGRAM(s) IV Push every 4 hours PRN Breakthrough pain  ondansetron Injectable 4 milliGRAM(s) IV Push every 6 hours PRN Postoperative Nausea and/or Vomiting if reglan is inadequate      Allergies    morphine (Rash)  sulfa drugs (Unknown)    Intolerances        Vital Signs Last 24 Hrs  T(C): 36.8 (24 Sep 2018 08:30), Max: 36.8 (23 Sep 2018 21:01)  T(F): 98.3 (24 Sep 2018 08:30), Max: 98.3 (23 Sep 2018 21:01)  HR: 78 (24 Sep 2018 08:30) (74 - 85)  BP: 117/73 (24 Sep 2018 08:30) (112/74 - 120/69)  BP(mean): --  RR: 18 (24 Sep 2018 08:30) (17 - 18)  SpO2: 99% (24 Sep 2018 08:30) (98% - 99%)    09-23 @ 07:01  -  09-24 @ 07:00  --------------------------------------------------------  IN: 825 mL / OUT: 2125 mL / NET: -1300 mL        PHYSICAL EXAM:    General: ; in no acute distress  HEENT: MMM, conjunctiva and sclera clear  Lungs: clear  Heart: regular  Gastrointestinal: Soft non-distended; Normal bowel sounds; No hepatosplenomegaly, hardwick in  Skin: Warm and dry. No obvious rash    LABS:      CBC Full  -  ( 24 Sep 2018 06:06 )  WBC Count : 4.8 K/uL  Hemoglobin : 10.3 g/dL  Hematocrit : 31.2 %  Platelet Count - Automated : 176 K/uL  Mean Cell Volume : 89.9 fL  Mean Cell Hemoglobin : 29.7 pg  Mean Cell Hemoglobin Concentration : 33.0 g/dL  Auto Neutrophil # : x  Auto Lymphocyte # : x  Auto Monocyte # : x  Auto Eosinophil # : x  Auto Basophil # : x  Auto Neutrophil % : x  Auto Lymphocyte % : x  Auto Monocyte % : x  Auto Eosinophil % : x  Auto Basophil % : x    09-24    140  |  101  |  7   ----------------------------<  104<H>  3.9   |  26  |  0.93    Ca    8.7      24 Sep 2018 06:06  Mg     1.9     09-24    TPro  5.9<L>  /  Alb  3.6  /  TBili  0.2  /  DBili  x   /  AST  15  /  ALT  14  /  AlkPhos  60  09-23                      RADIOLOGY & ADDITIONAL STUDIES (The following images were personally reviewed):

## 2018-09-24 NOTE — PROGRESS NOTE ADULT - SUBJECTIVE AND OBJECTIVE BOX
O/N: JERROD    Subjective: Pt seen and examined at bedside. C/o lower abdominal pain that radiates to right back. No other sx. Reports making urine into hardwick bag. Passing gas, no BMS but passing mucous.     VITAL SIGNS:  Vital Signs Last 24 Hrs  T(C): 36.7 (24 Sep 2018 06:04), Max: 36.8 (23 Sep 2018 21:01)  T(F): 98 (24 Sep 2018 06:04), Max: 98.3 (23 Sep 2018 21:01)  HR: 74 (24 Sep 2018 06:04) (74 - 85)  BP: 116/76 (24 Sep 2018 06:04) (112/74 - 120/69)  BP(mean): --  RR: 17 (24 Sep 2018 06:04) (17 - 18)  SpO2: 99% (24 Sep 2018 06:04) (98% - 99%)      PHYSICAL EXAM:  Constitutional: WDWN resting comfortably in bed; NAD  Respiratory: CTA B/L; no W/R/R, no retractions  Cardiac: +S1/S2; RRR; no M/R/G;   Gastrointestinal: abdomen soft, +BSx4, +guarding, no rebound, tender to palpation in lower quadrants and suprapubically  Back: +R CVA tenderness, none on left  Extremities: WWP, no clubbing or cyanosis; no peripheral edema  Dermatologic: skin warm, dry and intact; no rashes, wounds, or scars  Neurologic: AAOx3; no focal deficits        MEDICATIONS:  MEDICATIONS  (STANDING):  acetaminophen   Tablet .. 975 milliGRAM(s) Oral every 8 hours  busPIRone 20 milliGRAM(s) Oral <User Schedule>  diazepam    Tablet 5 milliGRAM(s) Oral every 8 hours  docusate sodium 100 milliGRAM(s) Oral three times a day  fentaNYL   Patch  25 MICROgram(s)/Hr. 1 Patch Transdermal every 48 hours  FLUoxetine 40 milliGRAM(s) Oral daily  lactated ringers. 1000 milliLiter(s) (75 mL/Hr) IV Continuous <Continuous>  polyethylene glycol 3350 17 Gram(s) Oral daily  senna 1 Tablet(s) Oral daily  simethicone 80 milliGRAM(s) Chew every 6 hours    MEDICATIONS  (PRN):  diphenhydrAMINE   Injectable 12.5 milliGRAM(s) IV Push every 4 hours PRN Rash and/or Itching  HYDROmorphone  Injectable 1.5 milliGRAM(s) IV Push every 4 hours PRN Breakthrough pain  ondansetron Injectable 4 milliGRAM(s) IV Push every 6 hours PRN Postoperative Nausea and/or Vomiting if reglan is inadequate      ALLERGIES:  Allergies    morphine (Rash)  sulfa drugs (Unknown)    Intolerances        LABS:                        10.3   4.8   )-----------( 176      ( 24 Sep 2018 06:06 )             31.2     09-24    140  |  101  |  7   ----------------------------<  104<H>  3.9   |  26  |  0.93    Ca    8.7      24 Sep 2018 06:06    TPro  5.9<L>  /  Alb  3.6  /  TBili  0.2  /  DBili  x   /  AST  15  /  ALT  14  /  AlkPhos  60  09-23      Fingerstick  glucose:     RADIOLOGY & ADDITIONAL TESTS: Reviewed.

## 2018-09-24 NOTE — PROGRESS NOTE ADULT - PROBLEM SELECTOR PLAN 1
s/p laparsospy for  endometriosis and appendectomy. ct abdomen shows R hydroureteronephrosis.   -urology following, follow recs  -dc IVF if tolerating sufficient PO, ? stent placement  -fluroscopy today, f/u results  -c/w Tylenol standing, Dilaudid 1.5mg PRN. Fentanyl patch 25mcg every 48hrs for pain control  -pain mgmt following, follow recs    #nausea  -zofran PRN    #BRBPR  -pt endorses 1 episode of a small amount of BRBPR (s/p appendectomy and rectal tube placement) when passing gas. hb is 12 and stable. rectal exam revealed no hemorrhoids, anal tears, and no gross blood or melena.  -HgB stable  -monitor CBC  -active T+S

## 2018-09-24 NOTE — PROGRESS NOTE ADULT - PROBLEM SELECTOR PLAN 2
chronic urinary retention, but worse s/p surgery. nl cystoscopy, s/p b/l ureteral stents placed during sx this admission. failed 2 TOVs.  - c/w valium for bladder spasm  -- will need CT urogram and CT cystogram as per Urology to further evaluate for injury  -PVR  -not starting flomax bc of sulfa allergy

## 2018-09-24 NOTE — PROGRESS NOTE ADULT - SUBJECTIVE AND OBJECTIVE BOX
Pt seen and examined at bedside.  She reports having right-sided flank pain that is the same as earlier today.  She reports her postoperative abdominal pain is well controlled.  She has been ambulating without assistance, voiding spontaneously, passing flatus, tolerating regular diet.  Denies nausea, vomiting, dizziness, chest pain, SOB.       T(F): 98.3 (09-24-18 @ 08:30), Max: 98.3 (09-23-18 @ 21:01)  HR: 78 (09-24-18 @ 08:30) (74 - 85)  BP: 117/73 (09-24-18 @ 08:30) (112/74 - 120/69)  RR: 18 (09-24-18 @ 08:30) (17 - 18)  SpO2: 99% (09-24-18 @ 08:30) (98% - 99%)  Wt(kg): --  I&O's Summary    23 Sep 2018 07:01  -  24 Sep 2018 07:00  --------------------------------------------------------  IN: 825 mL / OUT: 2125 mL / NET: -1300 mL        MEDICATIONS  (STANDING):  acetaminophen   Tablet .. 975 milliGRAM(s) Oral every 8 hours  busPIRone 20 milliGRAM(s) Oral <User Schedule>  diazepam    Tablet 5 milliGRAM(s) Oral every 8 hours  docusate sodium 100 milliGRAM(s) Oral three times a day  fentaNYL   Patch  25 MICROgram(s)/Hr. 1 Patch Transdermal every 48 hours  FLUoxetine 40 milliGRAM(s) Oral daily  lactated ringers. 1000 milliLiter(s) (75 mL/Hr) IV Continuous <Continuous>  polyethylene glycol 3350 17 Gram(s) Oral daily  senna 1 Tablet(s) Oral daily  simethicone 80 milliGRAM(s) Chew every 6 hours    MEDICATIONS  (PRN):  diphenhydrAMINE   Injectable 12.5 milliGRAM(s) IV Push every 4 hours PRN Rash and/or Itching  HYDROmorphone  Injectable 1.5 milliGRAM(s) IV Push every 4 hours PRN Breakthrough pain  ondansetron Injectable 4 milliGRAM(s) IV Push every 6 hours PRN Postoperative Nausea and/or Vomiting if reglan is inadequate      Physical Exam:  Constitutional: NAD  Pulmonary: clear to auscultation bilaterally   Cardiovascular: Regular rate and rhythm   Abdomen: incision site clean, dry, intact. Soft, mildly tender, [] distended, no guarding, no rebound, [] bowel sounds  Extremities: no lower extremity edema or calve tenderness. SCDs in place     LABS:                        10.3   4.8   )-----------( 176      ( 24 Sep 2018 06:06 )             31.2     09-24    140  |  101  |  7   ----------------------------<  104<H>  3.9   |  26  |  0.93    Ca    8.7      24 Sep 2018 06:06  Mg     1.9     09-24    TPro  5.9<L>  /  Alb  3.6  /  TBili  0.2  /  DBili  x   /  AST  15  /  ALT  14  /  AlkPhos  60  09-23          RADIOLOGY & ADDITIONAL TESTS: Pt seen and examined at bedside.  She reports having right-sided flank pain that is the same as earlier today.  She reports her postoperative abdominal pain is well controlled.  She has been ambulating without assistance, voiding spontaneously, passing flatus, tolerating regular diet.  Denies nausea, vomiting, dizziness, chest pain, SOB.       T(F): 98.3 (09-24-18 @ 08:30), Max: 98.3 (09-23-18 @ 21:01)  HR: 78 (09-24-18 @ 08:30) (74 - 85)  BP: 117/73 (09-24-18 @ 08:30) (112/74 - 120/69)  RR: 18 (09-24-18 @ 08:30) (17 - 18)  SpO2: 99% (09-24-18 @ 08:30) (98% - 99%)  Wt(kg): --  I&O's Summary    23 Sep 2018 07:01  -  24 Sep 2018 07:00  --------------------------------------------------------  IN: 825 mL / OUT: 2125 mL / NET: -1300 mL        MEDICATIONS  (STANDING):  acetaminophen   Tablet .. 975 milliGRAM(s) Oral every 8 hours  busPIRone 20 milliGRAM(s) Oral <User Schedule>  diazepam    Tablet 5 milliGRAM(s) Oral every 8 hours  docusate sodium 100 milliGRAM(s) Oral three times a day  fentaNYL   Patch  25 MICROgram(s)/Hr. 1 Patch Transdermal every 48 hours  FLUoxetine 40 milliGRAM(s) Oral daily  lactated ringers. 1000 milliLiter(s) (75 mL/Hr) IV Continuous <Continuous>  polyethylene glycol 3350 17 Gram(s) Oral daily  senna 1 Tablet(s) Oral daily  simethicone 80 milliGRAM(s) Chew every 6 hours    MEDICATIONS  (PRN):  diphenhydrAMINE   Injectable 12.5 milliGRAM(s) IV Push every 4 hours PRN Rash and/or Itching  HYDROmorphone  Injectable 1.5 milliGRAM(s) IV Push every 4 hours PRN Breakthrough pain  ondansetron Injectable 4 milliGRAM(s) IV Push every 6 hours PRN Postoperative Nausea and/or Vomiting if reglan is inadequate      Physical Exam:  Constitutional: NAD, sitting up in bed brushing her teeth.  Pulmonary: regular work of breathing   Abdomen: incision site clean, dry, intact. Soft, nontender, nondistended, no guarding, no rebound; Right-sided CVA tenderness   Extremities: no lower extremity edema or calve tenderness    LABS:                        10.3   4.8   )-----------( 176      ( 24 Sep 2018 06:06 )             31.2     09-24    140  |  101  |  7   ----------------------------<  104<H>  3.9   |  26  |  0.93    Ca    8.7      24 Sep 2018 06:06  Mg     1.9     09-24    TPro  5.9<L>  /  Alb  3.6  /  TBili  0.2  /  DBili  x   /  AST  15  /  ALT  14  /  AlkPhos  60  09-23          RADIOLOGY & ADDITIONAL TESTS:  < from: CT Abdomen and Pelvis w/wo IV Cont (09.24.18 @ 12:58) >    EXAM:  CT ABDOMEN AND PELVIS WAW IC                          PROCEDURE DATE:  09/24/2018          INTERPRETATION:  CT of the ABDOMEN and PELVIS with and without   intravenous contrast dated 9/24/2018 12:58 PM    INDICATION: Urinary retention.    TECHNIQUE: CT of the abdomen and pelvis was performed. Two phases were   obtained: noncontrast followed by a combined nephrographic and excretory   phase utilizing a split-bolus technique. Axial and coronal images were   produced and reviewed.    Intravenous contrast dose: Optiray 350. 125 cc injected. 5 cc discarded.    PRIOR STUDIES: 9/22/2020    FINDINGS: Images of the lower chest demonstrate slight patchy groundglass   opacities in the right lower lobe, decreased in density compared to study   of 9/22/2018.    The liver is normal in appearance.  No radiopaque stones are seen in the   gallbladder.  The pancreas is normal in appearance.  No splenic   abnormalities are seen.    The adrenal glands are unremarkable. Previously seen right hydronephrosis   has resolved. There is symmetrical enhancement of the kidneys. No filling   defect is seen in the collecting system of the kidneys or ureters.   Ureters are opacified throughout their length. There is no extravasation   of the contrast. No abdominal aortic aneurysm is seen. No lymphadenopathy   is seen.     Evaluation of the bowel demonstrates no bowel obstruction. There is a   small slightly complex ascites in the pelvis, decreased since prior study.    Images of the pelvis demonstrate demonstrate no uterine or adnexal mass.   Small amount of free pelvic gas is again noted, probably postoperative in   nature.    Evaluation of the osseous structures demonstrates a Zurita catheter within   the bladder lumen. Small amount of gas is present within the bladder   lumen. There is no abnormal enhancement of the vagina.      IMPRESSION:  Resolution of right hydronephrosis. No urinary leakage.    Small slightly complex pelvic ascites and small amount of free air within   the pelvis, likely postoperative.                  "Thank you for the opportunity to participate in the care of this   patient."        AELXA YA M.D., ATTENDING RADIOLOGIST  This document has been electronically signed. Sep 24 2018  2:33PM                  < end of copied text >

## 2018-09-25 VITALS
HEART RATE: 95 BPM | RESPIRATION RATE: 17 BRPM | OXYGEN SATURATION: 98 % | DIASTOLIC BLOOD PRESSURE: 58 MMHG | TEMPERATURE: 98 F | SYSTOLIC BLOOD PRESSURE: 97 MMHG

## 2018-09-25 LAB
ANION GAP SERPL CALC-SCNC: 12 MMOL/L — SIGNIFICANT CHANGE UP (ref 5–17)
APPEARANCE UR: CLEAR — SIGNIFICANT CHANGE UP
BASOPHILS NFR BLD AUTO: 0.4 % — SIGNIFICANT CHANGE UP (ref 0–2)
BILIRUB UR-MCNC: NEGATIVE — SIGNIFICANT CHANGE UP
BUN SERPL-MCNC: 7 MG/DL — SIGNIFICANT CHANGE UP (ref 7–23)
CALCIUM SERPL-MCNC: 8.8 MG/DL — SIGNIFICANT CHANGE UP (ref 8.4–10.5)
CHLORIDE SERPL-SCNC: 100 MMOL/L — SIGNIFICANT CHANGE UP (ref 96–108)
CO2 SERPL-SCNC: 24 MMOL/L — SIGNIFICANT CHANGE UP (ref 22–31)
COLOR SPEC: YELLOW — SIGNIFICANT CHANGE UP
CREAT SERPL-MCNC: 0.85 MG/DL — SIGNIFICANT CHANGE UP (ref 0.5–1.3)
DIFF PNL FLD: NEGATIVE — SIGNIFICANT CHANGE UP
EOSINOPHIL NFR BLD AUTO: 3.5 % — SIGNIFICANT CHANGE UP (ref 0–6)
GLUCOSE SERPL-MCNC: 104 MG/DL — HIGH (ref 70–99)
GLUCOSE UR QL: NEGATIVE — SIGNIFICANT CHANGE UP
HCT VFR BLD CALC: 30.8 % — LOW (ref 34.5–45)
HGB BLD-MCNC: 10.2 G/DL — LOW (ref 11.5–15.5)
KETONES UR-MCNC: NEGATIVE — SIGNIFICANT CHANGE UP
LEUKOCYTE ESTERASE UR-ACNC: ABNORMAL
LYMPHOCYTES # BLD AUTO: 24.4 % — SIGNIFICANT CHANGE UP (ref 13–44)
MAGNESIUM SERPL-MCNC: 1.7 MG/DL — SIGNIFICANT CHANGE UP (ref 1.6–2.6)
MCHC RBC-ENTMCNC: 29.7 PG — SIGNIFICANT CHANGE UP (ref 27–34)
MCHC RBC-ENTMCNC: 33.1 G/DL — SIGNIFICANT CHANGE UP (ref 32–36)
MCV RBC AUTO: 89.5 FL — SIGNIFICANT CHANGE UP (ref 80–100)
MONOCYTES NFR BLD AUTO: 7.2 % — SIGNIFICANT CHANGE UP (ref 2–14)
NEUTROPHILS NFR BLD AUTO: 64.5 % — SIGNIFICANT CHANGE UP (ref 43–77)
NITRITE UR-MCNC: NEGATIVE — SIGNIFICANT CHANGE UP
PH UR: 7.5 — SIGNIFICANT CHANGE UP (ref 5–8)
PLATELET # BLD AUTO: 180 K/UL — SIGNIFICANT CHANGE UP (ref 150–400)
POTASSIUM SERPL-MCNC: 3.6 MMOL/L — SIGNIFICANT CHANGE UP (ref 3.5–5.3)
POTASSIUM SERPL-SCNC: 3.6 MMOL/L — SIGNIFICANT CHANGE UP (ref 3.5–5.3)
PROT UR-MCNC: NEGATIVE MG/DL — SIGNIFICANT CHANGE UP
RBC # BLD: 3.44 M/UL — LOW (ref 3.8–5.2)
RBC # FLD: 11.9 % — SIGNIFICANT CHANGE UP (ref 10.3–16.9)
SODIUM SERPL-SCNC: 136 MMOL/L — SIGNIFICANT CHANGE UP (ref 135–145)
SP GR SPEC: <=1.005 — SIGNIFICANT CHANGE UP (ref 1–1.03)
UROBILINOGEN FLD QL: 0.2 E.U./DL — SIGNIFICANT CHANGE UP
WBC # BLD: 5.1 K/UL — SIGNIFICANT CHANGE UP (ref 3.8–10.5)
WBC # FLD AUTO: 5.1 K/UL — SIGNIFICANT CHANGE UP (ref 3.8–10.5)

## 2018-09-25 PROCEDURE — 96375 TX/PRO/DX INJ NEW DRUG ADDON: CPT

## 2018-09-25 PROCEDURE — 88302 TISSUE EXAM BY PATHOLOGIST: CPT

## 2018-09-25 PROCEDURE — 85730 THROMBOPLASTIN TIME PARTIAL: CPT

## 2018-09-25 PROCEDURE — 85610 PROTHROMBIN TIME: CPT

## 2018-09-25 PROCEDURE — 96374 THER/PROPH/DIAG INJ IV PUSH: CPT | Mod: XU

## 2018-09-25 PROCEDURE — 86431 RHEUMATOID FACTOR QUANT: CPT

## 2018-09-25 PROCEDURE — 96376 TX/PRO/DX INJ SAME DRUG ADON: CPT

## 2018-09-25 PROCEDURE — 99285 EMERGENCY DEPT VISIT HI MDM: CPT | Mod: 25

## 2018-09-25 PROCEDURE — 74177 CT ABD & PELVIS W/CONTRAST: CPT

## 2018-09-25 PROCEDURE — 93005 ELECTROCARDIOGRAM TRACING: CPT

## 2018-09-25 PROCEDURE — 83735 ASSAY OF MAGNESIUM: CPT

## 2018-09-25 PROCEDURE — 85025 COMPLETE CBC W/AUTO DIFF WBC: CPT

## 2018-09-25 PROCEDURE — 36415 COLL VENOUS BLD VENIPUNCTURE: CPT

## 2018-09-25 PROCEDURE — 86038 ANTINUCLEAR ANTIBODIES: CPT

## 2018-09-25 PROCEDURE — 81001 URINALYSIS AUTO W/SCOPE: CPT

## 2018-09-25 PROCEDURE — 86850 RBC ANTIBODY SCREEN: CPT

## 2018-09-25 PROCEDURE — 88305 TISSUE EXAM BY PATHOLOGIST: CPT

## 2018-09-25 PROCEDURE — 76705 ECHO EXAM OF ABDOMEN: CPT

## 2018-09-25 PROCEDURE — 84484 ASSAY OF TROPONIN QUANT: CPT

## 2018-09-25 PROCEDURE — 85027 COMPLETE CBC AUTOMATED: CPT

## 2018-09-25 PROCEDURE — 87186 SC STD MICRODIL/AGAR DIL: CPT

## 2018-09-25 PROCEDURE — 86901 BLOOD TYPING SEROLOGIC RH(D): CPT

## 2018-09-25 PROCEDURE — 80053 COMPREHEN METABOLIC PANEL: CPT

## 2018-09-25 PROCEDURE — 76856 US EXAM PELVIC COMPLETE: CPT

## 2018-09-25 PROCEDURE — 82550 ASSAY OF CK (CPK): CPT

## 2018-09-25 PROCEDURE — 86235 NUCLEAR ANTIGEN ANTIBODY: CPT

## 2018-09-25 PROCEDURE — 83690 ASSAY OF LIPASE: CPT

## 2018-09-25 PROCEDURE — A9585: CPT

## 2018-09-25 PROCEDURE — 87086 URINE CULTURE/COLONY COUNT: CPT

## 2018-09-25 PROCEDURE — 74178 CT ABD&PLV WO CNTR FLWD CNTR: CPT

## 2018-09-25 PROCEDURE — 86618 LYME DISEASE ANTIBODY: CPT

## 2018-09-25 PROCEDURE — 72197 MRI PELVIS W/O & W/DYE: CPT

## 2018-09-25 PROCEDURE — 72146 MRI CHEST SPINE W/O DYE: CPT

## 2018-09-25 PROCEDURE — 86900 BLOOD TYPING SEROLOGIC ABO: CPT

## 2018-09-25 PROCEDURE — 84702 CHORIONIC GONADOTROPIN TEST: CPT

## 2018-09-25 PROCEDURE — 80048 BASIC METABOLIC PNL TOTAL CA: CPT

## 2018-09-25 PROCEDURE — 81003 URINALYSIS AUTO W/O SCOPE: CPT

## 2018-09-25 PROCEDURE — 85379 FIBRIN DEGRADATION QUANT: CPT

## 2018-09-25 PROCEDURE — 82962 GLUCOSE BLOOD TEST: CPT

## 2018-09-25 PROCEDURE — 76830 TRANSVAGINAL US NON-OB: CPT

## 2018-09-25 PROCEDURE — 86041 ACETYLCHOLN RCPTR BNDNG ANTB: CPT

## 2018-09-25 RX ORDER — POTASSIUM CHLORIDE 20 MEQ
40 PACKET (EA) ORAL ONCE
Qty: 0 | Refills: 0 | Status: COMPLETED | OUTPATIENT
Start: 2018-09-25 | End: 2018-09-25

## 2018-09-25 RX ORDER — MAGNESIUM SULFATE 500 MG/ML
2 VIAL (ML) INJECTION ONCE
Qty: 0 | Refills: 0 | Status: COMPLETED | OUTPATIENT
Start: 2018-09-25 | End: 2018-09-25

## 2018-09-25 RX ORDER — DIAZEPAM 5 MG
5 TABLET ORAL EVERY 8 HOURS
Qty: 0 | Refills: 0 | Status: DISCONTINUED | OUTPATIENT
Start: 2018-09-25 | End: 2018-09-25

## 2018-09-25 RX ORDER — SIMETHICONE 80 MG/1
1 TABLET, CHEWABLE ORAL
Qty: 40 | Refills: 0 | OUTPATIENT
Start: 2018-09-25 | End: 2018-10-04

## 2018-09-25 RX ORDER — OXYCODONE HYDROCHLORIDE 5 MG/1
15 TABLET ORAL EVERY 4 HOURS
Qty: 0 | Refills: 0 | Status: DISCONTINUED | OUTPATIENT
Start: 2018-09-25 | End: 2018-09-25

## 2018-09-25 RX ORDER — SENNA PLUS 8.6 MG/1
1 TABLET ORAL
Qty: 20 | Refills: 0 | OUTPATIENT
Start: 2018-09-25 | End: 2018-10-14

## 2018-09-25 RX ORDER — FENTANYL CITRATE 50 UG/ML
1 INJECTION INTRAVENOUS
Qty: 0 | Refills: 0 | Status: DISCONTINUED | OUTPATIENT
Start: 2018-09-25 | End: 2018-09-25

## 2018-09-25 RX ORDER — POLYETHYLENE GLYCOL 3350 17 G/17G
17 POWDER, FOR SOLUTION ORAL
Qty: 340 | Refills: 0 | OUTPATIENT
Start: 2018-09-25 | End: 2018-10-14

## 2018-09-25 RX ORDER — HYDROMORPHONE HYDROCHLORIDE 2 MG/ML
1.5 INJECTION INTRAMUSCULAR; INTRAVENOUS; SUBCUTANEOUS ONCE
Qty: 0 | Refills: 0 | Status: DISCONTINUED | OUTPATIENT
Start: 2018-09-25 | End: 2018-09-25

## 2018-09-25 RX ORDER — HYDROMORPHONE HYDROCHLORIDE 2 MG/ML
1.5 INJECTION INTRAMUSCULAR; INTRAVENOUS; SUBCUTANEOUS EVERY 4 HOURS
Qty: 0 | Refills: 0 | Status: DISCONTINUED | OUTPATIENT
Start: 2018-09-25 | End: 2018-09-25

## 2018-09-25 RX ADMIN — Medication 20 MILLIGRAM(S): at 17:05

## 2018-09-25 RX ADMIN — SIMETHICONE 80 MILLIGRAM(S): 80 TABLET, CHEWABLE ORAL at 17:05

## 2018-09-25 RX ADMIN — SIMETHICONE 80 MILLIGRAM(S): 80 TABLET, CHEWABLE ORAL at 11:13

## 2018-09-25 RX ADMIN — ONDANSETRON 4 MILLIGRAM(S): 8 TABLET, FILM COATED ORAL at 07:10

## 2018-09-25 RX ADMIN — Medication 975 MILLIGRAM(S): at 09:27

## 2018-09-25 RX ADMIN — Medication 100 MILLIGRAM(S): at 06:02

## 2018-09-25 RX ADMIN — SENNA PLUS 1 TABLET(S): 8.6 TABLET ORAL at 11:14

## 2018-09-25 RX ADMIN — POLYETHYLENE GLYCOL 3350 17 GRAM(S): 17 POWDER, FOR SOLUTION ORAL at 11:13

## 2018-09-25 RX ADMIN — Medication 5 MILLIGRAM(S): at 13:49

## 2018-09-25 RX ADMIN — FENTANYL CITRATE 1 PATCH: 50 INJECTION INTRAVENOUS at 11:13

## 2018-09-25 RX ADMIN — OXYCODONE HYDROCHLORIDE 15 MILLIGRAM(S): 5 TABLET ORAL at 16:07

## 2018-09-25 RX ADMIN — HYDROMORPHONE HYDROCHLORIDE 1.5 MILLIGRAM(S): 2 INJECTION INTRAMUSCULAR; INTRAVENOUS; SUBCUTANEOUS at 07:30

## 2018-09-25 RX ADMIN — Medication 12.5 MILLIGRAM(S): at 07:14

## 2018-09-25 RX ADMIN — Medication 975 MILLIGRAM(S): at 08:05

## 2018-09-25 RX ADMIN — SIMETHICONE 80 MILLIGRAM(S): 80 TABLET, CHEWABLE ORAL at 00:37

## 2018-09-25 RX ADMIN — Medication 12.5 MILLIGRAM(S): at 02:57

## 2018-09-25 RX ADMIN — Medication 12.5 MILLIGRAM(S): at 12:20

## 2018-09-25 RX ADMIN — SODIUM CHLORIDE 75 MILLILITER(S): 9 INJECTION, SOLUTION INTRAVENOUS at 02:20

## 2018-09-25 RX ADMIN — HYDROMORPHONE HYDROCHLORIDE 1.5 MILLIGRAM(S): 2 INJECTION INTRAMUSCULAR; INTRAVENOUS; SUBCUTANEOUS at 03:16

## 2018-09-25 RX ADMIN — Medication 50 GRAM(S): at 12:04

## 2018-09-25 RX ADMIN — Medication 975 MILLIGRAM(S): at 15:42

## 2018-09-25 RX ADMIN — HYDROMORPHONE HYDROCHLORIDE 1.5 MILLIGRAM(S): 2 INJECTION INTRAMUSCULAR; INTRAVENOUS; SUBCUTANEOUS at 07:15

## 2018-09-25 RX ADMIN — Medication 5 MILLIGRAM(S): at 06:02

## 2018-09-25 RX ADMIN — FENTANYL CITRATE 1 PATCH: 50 INJECTION INTRAVENOUS at 11:15

## 2018-09-25 RX ADMIN — Medication 40 MILLIGRAM(S): at 11:13

## 2018-09-25 RX ADMIN — HYDROMORPHONE HYDROCHLORIDE 1.5 MILLIGRAM(S): 2 INJECTION INTRAMUSCULAR; INTRAVENOUS; SUBCUTANEOUS at 03:01

## 2018-09-25 RX ADMIN — Medication 975 MILLIGRAM(S): at 01:37

## 2018-09-25 RX ADMIN — HYDROMORPHONE HYDROCHLORIDE 1.5 MILLIGRAM(S): 2 INJECTION INTRAMUSCULAR; INTRAVENOUS; SUBCUTANEOUS at 12:19

## 2018-09-25 RX ADMIN — Medication 100 MILLIGRAM(S): at 13:48

## 2018-09-25 RX ADMIN — OXYCODONE HYDROCHLORIDE 15 MILLIGRAM(S): 5 TABLET ORAL at 15:07

## 2018-09-25 RX ADMIN — HYDROMORPHONE HYDROCHLORIDE 1.5 MILLIGRAM(S): 2 INJECTION INTRAMUSCULAR; INTRAVENOUS; SUBCUTANEOUS at 13:01

## 2018-09-25 RX ADMIN — Medication 40 MILLIEQUIVALENT(S): at 12:05

## 2018-09-25 RX ADMIN — Medication 975 MILLIGRAM(S): at 00:37

## 2018-09-25 RX ADMIN — SIMETHICONE 80 MILLIGRAM(S): 80 TABLET, CHEWABLE ORAL at 06:02

## 2018-09-25 RX ADMIN — Medication 975 MILLIGRAM(S): at 16:58

## 2018-09-25 NOTE — PROGRESS NOTE ADULT - SUBJECTIVE AND OBJECTIVE BOX
Patient lying comfortably in semi Marks's position.  Reports pain is 4-5/10 VAS    Current pain regime:    Fentanyl patch 25 mcg/hr q 72 hrs  hydromorphone 1.5 mg q 4 hrs IVP prn  diazepam 5 mg p q 8hrs      plan for TOV today  plan to d/c home: to continue fentanyl 25 mcg/ hr q 72 hrs                             convert hydromorphone IVP to oxycodone 15 mg  p.o. q 4 prn    f/u as outpatient 2 weeks

## 2018-09-25 NOTE — PROGRESS NOTE ADULT - SUBJECTIVE AND OBJECTIVE BOX
O/N: JERROD. Failed TOV x 2, with bladder scans >250, with straight caths x 2.     Subjective: Pt seen and examined at bedside. No new comlplaints. No SOB, or CP. C/o lower abdominal pain that radiates to right back, improved from yesterday. Reports unable to void/ no urge to void.     VITAL SIGNS:  Vital Signs Last 24 Hrs  T(C): 36.9 (25 Sep 2018 05:24), Max: 37.1 (24 Sep 2018 16:07)  T(F): 98.5 (25 Sep 2018 05:24), Max: 98.7 (24 Sep 2018 16:07)  HR: 76 (25 Sep 2018 05:24) (66 - 81)  BP: 108/68 (25 Sep 2018 05:24) (107/65 - 119/78)  BP(mean): --  RR: 17 (25 Sep 2018 05:24) (14 - 17)  SpO2: 99% (25 Sep 2018 05:24) (97% - 99%)    PHYSICAL EXAM:  Constitutional: WDWN resting comfortably in bed; NAD  Respiratory: CTA B/L; no W/R/R, no retractions  Cardiac: +S1/S2; RRR; no M/R/G;   Gastrointestinal: abdomen soft, +BSx4, +guarding, no rebound, midllytender to palpation in lower quadrants and suprapubically  Back: +R CVA tenderness, none on left  Extremities: WWP, no clubbing or cyanosis; no peripheral edema  Dermatologic: skin warm, dry and intact; no rashes, wounds, or scars  Neurologic: AAOx3; no focal deficits      MEDICATIONS:  MEDICATIONS  (STANDING):  acetaminophen   Tablet .. 975 milliGRAM(s) Oral every 8 hours  busPIRone 20 milliGRAM(s) Oral <User Schedule>  diazepam    Tablet 5 milliGRAM(s) Oral every 8 hours  docusate sodium 100 milliGRAM(s) Oral three times a day  fentaNYL   Patch  25 MICROgram(s)/Hr. 1 Patch Transdermal every 48 hours  FLUoxetine 40 milliGRAM(s) Oral daily  lactated ringers. 1000 milliLiter(s) (75 mL/Hr) IV Continuous <Continuous>  polyethylene glycol 3350 17 Gram(s) Oral daily  senna 1 Tablet(s) Oral daily  simethicone 80 milliGRAM(s) Chew every 6 hours    MEDICATIONS  (PRN):  diphenhydrAMINE   Injectable 12.5 milliGRAM(s) IV Push every 4 hours PRN Rash and/or Itching  HYDROmorphone  Injectable 1.5 milliGRAM(s) IV Push every 4 hours PRN Breakthrough pain  ondansetron Injectable 4 milliGRAM(s) IV Push every 6 hours PRN Postoperative Nausea and/or Vomiting if reglan is inadequate      ALLERGIES:  Allergies    morphine (Rash)  sulfa drugs (Unknown)    Intolerances        LABS:                        10.2   5.1   )-----------( 180      ( 25 Sep 2018 07:29 )             30.8     09-25    136  |  100  |  7   ----------------------------<  104<H>  3.6   |  24  |  0.85    Ca    8.8      25 Sep 2018 07:29  Mg     1.7     09-25        Fingerstick  glucose:     RADIOLOGY & ADDITIONAL TESTS: Reviewed.

## 2018-09-25 NOTE — PROGRESS NOTE ADULT - SUBJECTIVE AND OBJECTIVE BOX
Pt seen and examined at bedside. Pt states moderate abdominal pain with some relief with current pain regimen. Pt is tolerating regular diet and passing flatus. She feels the urge to void but not able to she states.   Pt denies fever, chills, chest pain, SOB, nausea, vomiting, lightheadedness, or dizziness.      T(F): 98.7 (09-25-18 @ 08:25), Max: 98.7 (09-24-18 @ 16:07)  HR: 80 (09-25-18 @ 08:25) (66 - 81)  BP: 115/72 (09-25-18 @ 08:25) (107/65 - 119/78)  RR: 18 (09-25-18 @ 08:25) (14 - 18)  SpO2: 97% (09-25-18 @ 08:25) (97% - 99%)  Wt(kg): --  I&O's Summary    24 Sep 2018 07:01  -  25 Sep 2018 07:00  --------------------------------------------------------  IN: 900 mL / OUT: 1200 mL / NET: -300 mL    MEDICATIONS  (STANDING):  acetaminophen   Tablet .. 975 milliGRAM(s) Oral every 8 hours  busPIRone 20 milliGRAM(s) Oral <User Schedule>  diazepam    Tablet 5 milliGRAM(s) Oral every 8 hours  docusate sodium 100 milliGRAM(s) Oral three times a day  fentaNYL   Patch  25 MICROgram(s)/Hr. 1 Patch Transdermal every 72 hours  FLUoxetine 40 milliGRAM(s) Oral daily  lactated ringers. 1000 milliLiter(s) (75 mL/Hr) IV Continuous <Continuous>  polyethylene glycol 3350 17 Gram(s) Oral daily  senna 1 Tablet(s) Oral daily  simethicone 80 milliGRAM(s) Chew every 6 hours    MEDICATIONS  (PRN):  diphenhydrAMINE   Injectable 12.5 milliGRAM(s) IV Push every 4 hours PRN Rash and/or Itching  ondansetron Injectable 4 milliGRAM(s) IV Push every 6 hours PRN Postoperative Nausea and/or Vomiting if reglan is inadequate  oxyCODONE    IR 15 milliGRAM(s) Oral every 4 hours PRN Moderate Pain (4 - 6)      Physical Exam:  Constitutional: NAD  GI: incision sites clean, dry, intact. Soft, mildly tender, nondistended, no guarding, no rebound, +bowel sounds  Extremities: no lower extremity edema or calve tenderness. SCDs in place     LABS:                        10.2   5.1   )-----------( 180      ( 25 Sep 2018 07:29 )             30.8     09-25    136  |  100  |  7   ----------------------------<  104<H>  3.6   |  24  |  0.85    Ca    8.8      25 Sep 2018 07:29  Mg     1.7     09-25        Urinalysis Basic - ( 25 Sep 2018 02:55 )    Color: Yellow / Appearance: Clear / SG: <=1.005 / pH: x  Gluc: x / Ketone: NEGATIVE  / Bili: Negative / Urobili: 0.2 E.U./dL   Blood: x / Protein: NEGATIVE mg/dL / Nitrite: NEGATIVE   Leuk Esterase: Small / RBC: < 5 /HPF / WBC 5-10 /HPF   Sq Epi: x / Non Sq Epi: 0-5 /HPF / Bacteria: Present /HPF        RADIOLOGY & ADDITIONAL TESTS:

## 2018-09-25 NOTE — PROGRESS NOTE ADULT - ASSESSMENT
21 yo F w/ pmh of chronic abdominal pain, endometriosis s/p dx lap in 2015 and 2016, ovarian cysts, anxiety, and asthma presenting with RUQ pain, nausea vomiting for the past 3 days s/p appendectomy and endometriosis removal. Transferred to [facility] for pain control and urinary retention.

## 2018-09-25 NOTE — PROGRESS NOTE ADULT - PROBLEM SELECTOR PROBLEM 5
Nutrition, metabolism, and development symptoms
Constipation
Nutrition, metabolism, and development symptoms

## 2018-09-25 NOTE — PROGRESS NOTE ADULT - PROBLEM SELECTOR PROBLEM 6
Transition of care performed with sharing of clinical summary
Nutrition, metabolism, and development symptoms
Transition of care performed with sharing of clinical summary

## 2018-09-25 NOTE — PROGRESS NOTE ADULT - ASSESSMENT
21yo POD7/HD9 s/p diagnostic laparoscopy, endometriosis excision, appendectomy and cystoscopy. Pt is hemodynamically stable. Zurita catheter was removed yesterday evening and Pt has failed to urinate on own and was straight catheterized. Pt has another TOV.   -primary management per medicine     Neuro: Toradol and Tylenol standing, Dilaudid 1.5mg PRN. Fentanyl patch 25mcg every 48hrs   Anxiety: continue Prozac, Buspar and Diazepam TID   Cardio: No issues   Pulm: encourage ISS  GI: regular diet, Zofran prn. Simethicone and Colace    :  CT performed which shows resolved right hydronephrosis, plan of care per urology and primary medicine team.  TOV   Activity: ambulate as tolerated   DVT ppx: SCDs, ambulation   Follow up pain management, medicine recommendations   Social work- follow up drug rehab placement outpatient

## 2018-09-25 NOTE — PROGRESS NOTE ADULT - SUBJECTIVE AND OBJECTIVE BOX
Neurology Follow up note    Name  ANU SAN    HPI:  22 yoF w/ pmh of chronic abdominal pain, endometriosis s/p dx lap in 2015 and 2016, ovarian cysts, anxiety, and asthma presenting with RUQ pain, nausea vomiting for the past 3 days. Pt states that her chronic pain in RLQ but 3 days she started experiencing RUQ pain that is sharp in nature and radiates to chest. Poor PO intake since onset and pt has NBNB vomiting everytime she eats. No diarrhea. No fevers but felt cold at home.   ROS negative for headache, dizziness, SOB. Patient with chronic hx of urinary retention, recieved injections? for muscle spasticity.   GI hx significant for colonoscopies and endoscopies in the past showing  gastritis and colitis of unknown origin.   In Er, VSS. No leukocytosis. Ct abd showing possible appendicitis, patient seen by Surgery- per recs unlikely appedicitis can folowup opt. No gallstones on RUQ US. Seen by GYn, pain unlikely GNY in etiology. Recommended opt f/u (17 Sep 2018 03:13)      Interval History - abdominal pain - no new radicular symptoms        REVIEW OF SYSTEMS    Vital Signs Last 24 Hrs  T(C): 37.1 (25 Sep 2018 08:25), Max: 37.1 (24 Sep 2018 16:07)  T(F): 98.7 (25 Sep 2018 08:25), Max: 98.7 (24 Sep 2018 16:07)  HR: 80 (25 Sep 2018 08:25) (66 - 81)  BP: 115/72 (25 Sep 2018 08:25) (107/65 - 119/78)  BP(mean): --  RR: 18 (25 Sep 2018 08:25) (14 - 18)  SpO2: 97% (25 Sep 2018 08:25) (97% - 99%)    Physical Exam-     Mental Status-  awake and alert    Cranial Nerves- full EOM    Gait and station- no foot drop    Motor- nl    Reflexes- intact    Sensation- no sensory level    Coordination- no tremors    Vascular - no bruits    Medications  acetaminophen   Tablet .. 975 milliGRAM(s) Oral every 8 hours  busPIRone 20 milliGRAM(s) Oral <User Schedule>  diazepam    Tablet 5 milliGRAM(s) Oral every 8 hours  diphenhydrAMINE   Injectable 12.5 milliGRAM(s) IV Push every 4 hours PRN  docusate sodium 100 milliGRAM(s) Oral three times a day  fentaNYL   Patch  25 MICROgram(s)/Hr. 1 Patch Transdermal every 48 hours  FLUoxetine 40 milliGRAM(s) Oral daily  lactated ringers. 1000 milliLiter(s) IV Continuous <Continuous>  ondansetron Injectable 4 milliGRAM(s) IV Push every 6 hours PRN  oxyCODONE    IR 15 milliGRAM(s) Oral every 4 hours PRN  polyethylene glycol 3350 17 Gram(s) Oral daily  senna 1 Tablet(s) Oral daily  simethicone 80 milliGRAM(s) Chew every 6 hours      Lab      Radiology    Assessment-Abdominal pain - no mylopathy    Plan as per

## 2018-09-25 NOTE — PROGRESS NOTE ADULT - PROBLEM SELECTOR PLAN 5
F: IVF LR at 75 ccs  E: Maintain K>4, Mg>2  N: reg diet  Full code
- Chronic hx of constipation possibly 2/2 opioids  - c/w Colace.  -uses Miralax prn at home
F: IVF LR at 75 ccs  E: Maintain K>4, Mg>2  N: reg diet  Full code

## 2018-09-25 NOTE — PROGRESS NOTE ADULT - PROBLEM SELECTOR PLAN 2
chronic urinary retention, but worse s/p surgery. nl cystoscopy, s/p b/l ureteral stents placed during sx this admission. failed 2 TOVs.  - c/w valium for bladder spasm  -as above  -not starting flomax bc of sulfa allergy

## 2018-09-25 NOTE — PROGRESS NOTE ADULT - PROBLEM SELECTOR PLAN 3
-continue Prozac, Buspar and Diazepam TID
Sees urology inpt for urinary retention  - States taking Tizanidine 4mg at home- confirm if patient brought home medication and start while inpatient
-continue Prozac, Buspar and Diazepam TID

## 2018-09-25 NOTE — PROGRESS NOTE ADULT - PROVIDER SPECIALTY LIST ADULT
GYN
Internal Medicine
Neurology
Pain Medicine
Urology
Internal Medicine

## 2018-09-25 NOTE — PROGRESS NOTE ADULT - PROBLEM SELECTOR PLAN 6
1) PCP Contacted on Admission: Y --> Name & Phone #: Dr. Wilson  2) Date of Contact with PCP: Dr. Wilson following patient while inpatient  3) PCP Contacted at Discharge: (Y/N)  4) Summary of Handoff Given to PCP:   5) Post-Discharge Appointment Date and Location:
F: NS@ 120 cc/hour  E: Maintain K>4, Mg>2  N: advance as tolerated     Full code
1) PCP Contacted on Admission: Y --> Name & Phone #: Dr. Wilson  2) Date of Contact with PCP: Dr. Wilson following patient while inpatient  3) PCP Contacted at Discharge: (Y/N)  4) Summary of Handoff Given to PCP:   5) Post-Discharge Appointment Date and Location:

## 2018-09-25 NOTE — PROGRESS NOTE ADULT - PROBLEM SELECTOR PROBLEM 1
Abdominal pain, unspecified abdominal location

## 2018-09-25 NOTE — PROGRESS NOTE ADULT - REASON FOR ADMISSION
abdominal pain

## 2018-09-25 NOTE — PROGRESS NOTE ADULT - PROBLEM SELECTOR PLAN 1
s/p laparsospy for  endometriosis and appendectomy. ct abdomen shows R hydroureteronephrosis. repeat ct urogram showed resolution of hydro.   -urology following, follow recs  -dc IVF if tolerating sufficient PO, ? stent placement  -fluroscopy today, f/u results  -c/w Tylenol standing, Dilaudid 1.5mg PRN. Fentanyl patch 25mcg every 48hrs for pain control  -pain mgmt following, follow recs of Dr. Ramírez, needs to transition to oral (did not want to transition without talking to Dr. Ramírez first about regimen)    #urinary retention  -hardwick removed, failed TOV  -repeat TOV for third time, if fails--> reinsert hardwick    #nausea  -zofran PRN    #BRBPR  -pt endorses 1 episode of a small amount of BRBPR (s/p appendectomy and rectal tube placement) when passing gas. hb is 12 and stable. rectal exam revealed no hemorrhoids, anal tears, and no gross blood or melena.  -HgB stable  -monitor CBC  -active T+S

## 2018-09-29 DIAGNOSIS — Z88.5 ALLERGY STATUS TO NARCOTIC AGENT: ICD-10-CM

## 2018-09-29 DIAGNOSIS — R33.9 RETENTION OF URINE, UNSPECIFIED: ICD-10-CM

## 2018-09-29 DIAGNOSIS — N80.8 OTHER ENDOMETRIOSIS: ICD-10-CM

## 2018-09-29 DIAGNOSIS — K37 UNSPECIFIED APPENDICITIS: ICD-10-CM

## 2018-09-29 DIAGNOSIS — Z88.2 ALLERGY STATUS TO SULFONAMIDES: ICD-10-CM

## 2018-09-29 DIAGNOSIS — K66.0 PERITONEAL ADHESIONS (POSTPROCEDURAL) (POSTINFECTION): ICD-10-CM

## 2018-09-29 DIAGNOSIS — F41.9 ANXIETY DISORDER, UNSPECIFIED: ICD-10-CM

## 2018-09-29 DIAGNOSIS — J45.909 UNSPECIFIED ASTHMA, UNCOMPLICATED: ICD-10-CM

## 2018-09-29 DIAGNOSIS — K59.00 CONSTIPATION, UNSPECIFIED: ICD-10-CM

## 2018-09-29 DIAGNOSIS — F19.20 OTHER PSYCHOACTIVE SUBSTANCE DEPENDENCE, UNCOMPLICATED: ICD-10-CM

## 2018-09-29 DIAGNOSIS — N32.89 OTHER SPECIFIED DISORDERS OF BLADDER: ICD-10-CM

## 2018-09-29 DIAGNOSIS — N30.30 TRIGONITIS WITHOUT HEMATURIA: ICD-10-CM

## 2022-07-26 NOTE — PRE-OP CHECKLIST - BMI (KG/M2)
26.6
You received a dose of Tylenol today at 3:30 PM. If needed, next dose time is 9:30 PM this evening. Do not exceed 4,000 mg of Tylenol in a 24 hour period.

## 2024-07-02 NOTE — H&P ADULT - FAMILY HISTORY
Post-Care Instructions: I reviewed with the patient in detail post-care instructions. Patient is to wear sunprotection, and avoid picking at any of the treated lesions. Pt may apply Vaseline to crusted or scabbing areas. Add 52 Modifier (Optional): no Spray Paint Text: The liquid nitrogen was applied to the skin utilizing a spray paint frosting technique. Show Applicator Variable?: Yes Consent: The patient's consent was obtained including but not limited to risks of crusting, scabbing, blistering, scarring, darker or lighter pigmentary change, recurrence, incomplete removal and infection. Medical Necessity Information: It is in your best interest to select a reason for this procedure from the list below. All of these items fulfill various CMS LCD requirements except the new and changing color options. Medical Necessity Clause: This procedure was medically necessary because the lesions that were treated were: Detail Level: Detailed No pertinent family history in first degree relatives